# Patient Record
Sex: FEMALE | Employment: UNEMPLOYED | URBAN - METROPOLITAN AREA
[De-identification: names, ages, dates, MRNs, and addresses within clinical notes are randomized per-mention and may not be internally consistent; named-entity substitution may affect disease eponyms.]

---

## 2019-12-31 ENCOUNTER — TRANSFERRED RECORDS (OUTPATIENT)
Dept: HEALTH INFORMATION MANAGEMENT | Facility: CLINIC | Age: 59
End: 2019-12-31

## 2021-04-28 ENCOUNTER — COMMUNICATION - HEALTHEAST (OUTPATIENT)
Dept: CARDIOLOGY | Facility: CLINIC | Age: 61
End: 2021-04-28

## 2021-06-05 ENCOUNTER — TRANSFERRED RECORDS (OUTPATIENT)
Dept: HEALTH INFORMATION MANAGEMENT | Facility: CLINIC | Age: 61
End: 2021-06-05

## 2021-06-21 NOTE — LETTER
Letter by Uriel Mota MD at      Author: Uriel Mota MD Service: -- Author Type: --    Filed:  Encounter Date: 4/28/2021 Status: (Other)         Alejandra Mendoza  [address]  [place] [address]      April 28, 2021      Dear Alejandra,    This letter is to remind you that you are due for your follow up appointment with Dr. Uriel Mota  . To help ensure you are in the best health possible, a regular follow-up with your cardiologist is essential.     Please call our Patient Scheduling Line at [phone] to schedule your appointment at your earliest convenience.  If you have recently scheduled an appointment, please disregard this letter.    We look forward to seeing you again. As always, we are available at the number  above for any questions or concerns you may have.      Sincerely,     The Physicians and Staff of [facility] [place]

## 2021-06-26 ENCOUNTER — COMMUNICATION - HEALTHEAST (OUTPATIENT)
Dept: SCHEDULING | Facility: CLINIC | Age: 61
End: 2021-06-26

## 2021-07-06 NOTE — TELEPHONE ENCOUNTER
RECORDS RECEIVED FROM: Fast Heart Beat, Chest Pain, Chest Tightness   DATE RECEIVED:   NOTES STATUS DETAILS   OFFICE NOTE from referring provider    N/A    OFFICE NOTE from other cardiologist    Care Everywhere [facility]:  12/31/19 - PCC OV with Dr. Langford   DISCHARGE SUMMARY from hospital    N/A    DISCHARGE REPORT from the ER   Care Everywhere [facility]:  6/5/21 - UC OV with Isabel Sanon NP  11/16/20 - UC OV with Dr. Chadwick    [facility]:  11/16/20 - ED OV with Dr. Kent    Critical access hospital - [place]:  12/30/19 - UC OV with Flaquita Mendoza NP   OPERATIVE REPORT    N/A    MEDICATION LIST   Care Everywhere    LABS     BMP   [facility]:  11[facility]20    [facility]  12[facility]   CBC   Care [facility]where [facility]:  11/16/20   CMP   N/A    Lipids   Care [facility]where [facility]:  12/31/19   TSH   N/A    DIAGNOSTIC PROCEDURES     EKG   In process / Internal [facility]:  6/5/21 - ECG 12-lead  12/31/19 - ECG 12-lead    [facility]:  11/17/20 - ECG 12-Lead   Monitor Reports   N/A    IMAGING (DISC & REPORT)      Echo   N/A    Stress Tests   N/A    Cath   N/A    MRI/MRA   N/A    Xray Internal ealth:  11/16/20 - XR Chest   CT/CTA   N/A      Records Requested  07/06/21    [facility]  Fax: [phone]   Outcome * 7/6/21 1:03 PM Faxed req to  for EKG to be sent to the clinic. - Jami    7-6: sent EKGs to scanning

## 2021-08-09 ENCOUNTER — PRE VISIT (OUTPATIENT)
Dept: CARDIOLOGY | Facility: CLINIC | Age: 61
End: 2021-08-09

## 2021-08-30 ENCOUNTER — LAB (OUTPATIENT)
Dept: LAB | Facility: CLINIC | Age: 61
End: 2021-08-30
Payer: COMMERCIAL

## 2021-08-30 ENCOUNTER — OFFICE VISIT (OUTPATIENT)
Dept: CARDIOLOGY | Facility: CLINIC | Age: 61
End: 2021-08-30
Attending: INTERNAL MEDICINE
Payer: COMMERCIAL

## 2021-08-30 VITALS
DIASTOLIC BLOOD PRESSURE: 78 MMHG | HEART RATE: 60 BPM | SYSTOLIC BLOOD PRESSURE: 115 MMHG | WEIGHT: 176.1 LBS | OXYGEN SATURATION: 97 % | HEIGHT: 62 IN | BODY MASS INDEX: 32.41 KG/M2

## 2021-08-30 DIAGNOSIS — R00.0 TACHYCARDIA: Primary | ICD-10-CM

## 2021-08-30 DIAGNOSIS — R00.0 TACHYCARDIA: ICD-10-CM

## 2021-08-30 DIAGNOSIS — I20.0 UNSTABLE ANGINA (H): ICD-10-CM

## 2021-08-30 LAB
CHOLEST SERPL-MCNC: 254 MG/DL
CREAT UR-MCNC: 18 MG/DL
ERYTHROCYTE [DISTWIDTH] IN BLOOD BY AUTOMATED COUNT: 18.1 % (ref 10–15)
FASTING STATUS PATIENT QL REPORTED: ABNORMAL
HCT VFR BLD AUTO: 40.7 % (ref 35–47)
HDLC SERPL-MCNC: 30 MG/DL
HGB BLD-MCNC: 12.3 G/DL (ref 11.7–15.7)
LDLC SERPL CALC-MCNC: 163 MG/DL
LDLC SERPL CALC-MCNC: 178 MG/DL
MCH RBC QN AUTO: 20 PG (ref 26.5–33)
MCHC RBC AUTO-ENTMCNC: 30.2 G/DL (ref 31.5–36.5)
MCV RBC AUTO: 66 FL (ref 78–100)
MICROALBUMIN UR-MCNC: <5 MG/L
MICROALBUMIN/CREAT UR: NORMAL MG/G{CREAT}
NONHDLC SERPL-MCNC: 224 MG/DL
PLATELET # BLD AUTO: 315 10E3/UL (ref 150–450)
RBC # BLD AUTO: 6.14 10E6/UL (ref 3.8–5.2)
TRIGL SERPL-MCNC: 228 MG/DL
TSH SERPL DL<=0.005 MIU/L-ACNC: 0.55 MU/L (ref 0.4–4)
WBC # BLD AUTO: 5.4 10E3/UL (ref 4–11)

## 2021-08-30 PROCEDURE — 99204 OFFICE O/P NEW MOD 45 MIN: CPT | Performed by: INTERNAL MEDICINE

## 2021-08-30 PROCEDURE — 80061 LIPID PANEL: CPT | Performed by: PATHOLOGY

## 2021-08-30 PROCEDURE — 84443 ASSAY THYROID STIM HORMONE: CPT | Performed by: PATHOLOGY

## 2021-08-30 PROCEDURE — 83721 ASSAY OF BLOOD LIPOPROTEIN: CPT | Mod: 90 | Performed by: PATHOLOGY

## 2021-08-30 PROCEDURE — 36415 COLL VENOUS BLD VENIPUNCTURE: CPT | Performed by: PATHOLOGY

## 2021-08-30 PROCEDURE — 83695 ASSAY OF LIPOPROTEIN(A): CPT | Mod: 90 | Performed by: PATHOLOGY

## 2021-08-30 PROCEDURE — 93005 ELECTROCARDIOGRAM TRACING: CPT

## 2021-08-30 PROCEDURE — 85027 COMPLETE CBC AUTOMATED: CPT | Performed by: PATHOLOGY

## 2021-08-30 PROCEDURE — G0463 HOSPITAL OUTPT CLINIC VISIT: HCPCS | Mod: 25

## 2021-08-30 PROCEDURE — 82043 UR ALBUMIN QUANTITATIVE: CPT | Performed by: PATHOLOGY

## 2021-08-30 RX ORDER — ACETAMINOPHEN 500 MG
500 TABLET ORAL
COMMUNITY

## 2021-08-30 ASSESSMENT — MIFFLIN-ST. JEOR: SCORE: 1324.03

## 2021-08-30 NOTE — NURSING NOTE
Labs: Patient was given results of the laboratory testing obtained today and patient was instructed about when to return for the next laboratory testing. Labs today (cmp, cbc, lipid, lipoprotein a, urine for albuminuria)    Med Reconcile: Reviewed and verified all current medications with the patient. The updated medication list was printed and given to the patient.     Return Appointment: Patient given instructions regarding scheduling next clinic visit. *echo with stress    Patient stated she understood all health information given and agreed to call with further questions or concerns.     Mari Gil RN

## 2021-08-30 NOTE — LETTER
Date:October 18, 2021      Patient was self referred, no letter generated. Do not send.        [facility] Health Information

## 2021-08-30 NOTE — PROGRESS NOTES
"Chief Complaint   Patient presents with     New Patient     New Pt Visit, c/o chest pain, fatigue and \"fast heart rate\"      Farrukh Pham, EMT  Clinic Support  [employer]    [phone]    Employed by [facility] [employer]    HPI:     I had the privilege to evaluate Ms Roberts \"Dana\" Faheem Kumar, who is a 60 yr old female patient with a Hx of chest pain and palpitations.  Patient has a history of Hypertension and hypercholesterolemia.   Patient has thalassemia minor.  Patient had similar symptoms last year.    PAST MEDICAL HISTORY:  Hypertension  Hypercholesterolemia    CURRENT MEDICATIONS:  Current Outpatient Medications   Medication Sig Dispense Refill     diphenhydrAMINE-acetaminophen (TYLENOL PM)  MG tablet Take 1 tablet by mouth nightly as needed for sleep       melatonin 5 MG tablet Take 5 mg by mouth nightly as needed for sleep       acetaminophen (TYLENOL) 500 MG tablet Take 500 mg by mouth         PAST SURGICAL HISTORY:  Tonsillectomy      ALLERGIES   No Known Allergies    FAMILY HISTORY:    No Major Problems Daughter       Arthritis Father       Hypertension Father       Other Father   brain aneurysm   Cancer-Colon Maternal Grandmother       Cancer-Breast Mother       Cancer-Stomach Paternal Grandmother       Cancer Sister   Brain Tumor   No Major Problems Son         Relation Name Status Comments   Daughter   Alive     Father    (Age 52)     Maternal Grandmother    (Age 70's)     Mother   Alive     Paternal Grandmother        Sister    (Age 29)     Son   Alive         SOCIAL HISTORY:  Social History     Socioeconomic History     Marital status:      Spouse name: None     Number of children: None     Years of education: None     Highest education level: None   Occupational History     None   Tobacco Use     Former smoker      Smokeless tobacco: Never Used   Substance and Sexual Activity     Alcohol use: None     " "Drug use: None     Sexual activity: None   Other Topics Concern     None   Social History Narrative     None     Social Determinants of Health     Financial Resource Strain:      Difficulty of Paying Living Expenses:    Food Insecurity:      Worried About Running Out of Food in the Last Year:      Ran Out of Food in the Last Year:    Transportation Needs:      Lack of Transportation (Medical):      Lack of Transportation (Non-Medical):    Physical Activity:      Days of Exercise per Week:      Minutes of Exercise per Session:    Stress:      Feeling of Stress :    Social Connections:      Frequency of Communication with Friends and Family:      Frequency of Social Gatherings with Friends and Family:      Attends Hinduism Services:      Active Member of Clubs or Organizations:      Attends Club or Organization Meetings:      Marital Status:    Intimate Partner Violence:      Fear of Current or Ex-Partner:      Emotionally Abused:      Physically Abused:      Sexually Abused:        ROS:   Constitutional: No fever, chills, or sweats. No weight gain/loss   ENT: No visual disturbance, ear ache, epistaxis, sore throat  Allergies/Immunologic: Negative.   Respiratory: No cough, hemoptysia  Cardiovascular: As per HPI  GI: No nausea, vomiting, hematemesis, melena, or hematochezia  : No urinary frequency, dysuria, or hematuria  Integument: Negative  Psychiatric: Negative  Neuro: Negative  Endocrinology: Negative   Musculoskeletal: Negative    EXAM:  /78 (BP Location: Right arm, Patient Position: Sitting, Cuff Size: Adult Regular)   Pulse 60   Ht 1.57 m (5' 1.81\")   Wt 79.9 kg (176 lb 1.6 oz)   SpO2 97%   BMI 32.41 kg/m    In general, the patient is a pleasant female in no apparent distress.    HEENT: NC/AT.  PERRLA.  EOMI.  Sclerae white, not injected.  Nares clear.  Pharynx without erythema or exudate.  Dentition intact.    Neck: No adenopathy.  No thyromegaly. Carotids +4/4 bilaterally without bruits.  No " jugular venous distension.   Heart: RRR. Normal S1, S2 splits physiologically. No murmur, rub, click, or gallop. The PMI is in the 5th ICS in the midclavicular line. There is no heave.    Lungs: CTA.  No ronchi, wheezes, rales.  No dullness to percussion.   Abdomen: Soft, nontender, nondistended. No organomegaly.  No bruits.   Extremities: No clubbing, cyanosis, or edema.  The pulses are +4/4 at the radial, brachial, femoral, popliteal, DP, and PT sites bilaterally.  No bruits are noted.  Neurologic: Alert and oriented to person/place/time, normal speech, gait and affect  Skin: No petechiae, purpura or rash.    Labs:  LIPID RESULTS:  Lab Results   Component Value Date    CHOL 254 (H) 08/30/2021    HDL 30 (L) 08/30/2021     (H) 08/30/2021     (H) 08/30/2021    TRIG 228 (H) 08/30/2021    NHDL 224 (H) 08/30/2021         CBC RESULTS:  Lab Results   Component Value Date    WBC 5.4 08/30/2021    RBC 6.14 (H) 08/30/2021    HGB 12.3 08/30/2021    HCT 40.7 08/30/2021    MCV 66 (L) 08/30/2021    MCH 20.0 (L) 08/30/2021    MCHC 30.2 (L) 08/30/2021    RDW 18.1 (H) 08/30/2021     08/30/2021       BMP RESULTS:  Lab Results   Component Value Date     11/16/2020    POTASSIUM 3.8 11/16/2020    CHLORIDE 108 (H) 11/16/2020    CO2 26 11/16/2020    ANIONGAP 6 11/16/2020    GLC 94 11/16/2020    BUN 9 11/16/2020    CR 1.00 11/16/2020    GFRESTIMATED 57 (L) 11/16/2020    GFRESTBLACK >60 11/16/2020    BETY 8.8 11/16/2020          INR RESULTS:  Lab Results   Component Value Date    INR 0.99 11/16/2020       Procedures:    EKG: sin bradycardia    Echocardiogram    Normal, low-risk dobutamine echocardiogram without evidence of ischemia.  The target heart rate was achieved.  Normal biventricular size, thickness, and global systolic function at  baseline, LVEF=60-65%.  With low-dose dobutamine, LVEF augmented and LV cavity size decreased  appropriately.  With peak dobutamine, LVEF increased further to >70% and LV cavity  size  decreased appropriately.  No regional wall motion abnormalities at rest or with dobutamine.  No angina was elicited.  No ECG evidence of ischemia. Normal heart rate and blood pressure response to  dobutamine.  No significant valvular abnormalities are noted on screening Doppler exam.  The aortic root and visualized ascending aorta are normal.     ______________________________________________________________________________  Stress  The drug infusion was stopped due to target heart rate achieved.  The patient did not exhibit any symptoms during drug infusion.  Patient experienced 1/10 right sided chest pain once drug infusion stopped,  which resolved 3 minutes into recovery.  The maximum dose of dobutamine was 30mcg/kg/min.  The maximum dose of metoprolol was 1mg.  Definity (NDC #38163-060-66) given intravenously.  Patient was given 7ml mixture of 1.5ml Definity and 8.5ml saline.  3 ml wasted.  IV start location R Forearm .  Definity Expiration 11/01/2022 .  Definity Lot # [id number] .  This was a normal stress EKG with no evidence of stress-induced ischemia.  This was a normal stress echocardiogram with no evidence of stress-induced  ischemia.     Baseline  Resting ECG is normal.  Normal left ventricular function and wall motion at rest.     Stress Results                                       Maximum Predicted HR:   161 bpm             Target HR: 137 bpm        % Maximum Predicted HR: 88 %                           Stage DurationHeart Rate  BP   Dose                               (mm:ss)   (bpm)                      Baseline  0:00      59    131/86 0.00                        Peak    8:17      142   158/9630.00                         Stress Duration:   8:17 mm:ss *                      Maximum Stress HR: 142 bpm *     Left Ventricle  The left ventricle is normal in size. There is normal left ventricular wall  thickness. The visual ejection fraction is 55-60%.     Right Ventricle  Normal right ventricle structure  and size.     Atria  Normal left atrial size.     Mitral Valve  The mitral valve leaflets appear normal. There is no evidence of stenosis,  fluttering, or prolapse. There is no mitral regurgitation noted.     Tricuspid Valve  Normal tricuspid valve. There is trace tricuspid regurgitation.     Aortic Valve  Normal tricuspid aortic valve. No aortic regurgitation is present.     Pulmonic Valve  The pulmonic valve is not well seen, but is grossly normal. There is trace  pulmonic valvular regurgitation.     Vessels  Normal size ascending aorta.     Pericardium  The pericardium appears normal.     Procedure  Dobutamine stress echo with two dimensional color and spectral Doppler  performed.    Assessment and Plan:     I discussed the results with patient.  I disucssed the importance of a heart healthy diet and lifestyle.  Stress echocardiography did not show reversible myocardial ischemia.  Because of her high LDL-chol and hypertension, hypercholesterolemia and chest pain - I propose to order a CAC score which will guide us regarding starting lipid-lowering therapy in function of CAC score.    Follow-up within 1 yr with labs comprehensive metabolic panel and lipid profile with direct LDL-chol.    Niall Hernandez MD, PhD  Professor of Medicine  Division of Cardiology                  CC  Patient Care Team:  No Ref-Primary, Physician as PCP - General  Niall Hernandez MD as MD (Cardiovascular Disease)  SELF, REFERRED

## 2021-08-30 NOTE — LETTER
"2021      RE: Alejandra Kumar  [address]  [place] [address]       Dear Colleague,    Thank you for the opportunity to participate in the care of your patient, Alejandra Kumar, at the [facility] [place] [facility]. Please see a copy of my visit note below.    Chief Complaint   Patient presents with     New Patient     New Pt Visit, c/o chest pain, fatigue and \"fast heart rate\"      Farrukh Pham, EMT  Clinic Support  [facility]    [phone]    Employed by [facility] [employer]    HPI:     I had the privilege to evaluate Ms Roberts \"Dana\" Faheem Kumar, who is a 60 yr old female patient with a Hx of chest pain and palpitations.  Patient has a history of Hypertension and hypercholesterolemia.   Patient has thalassemia minor.  Patient had similar symptoms last year.    PAST MEDICAL HISTORY:  Hypertension  Hypercholesterolemia    CURRENT MEDICATIONS:  Current Outpatient Medications   Medication Sig Dispense Refill     diphenhydrAMINE-acetaminophen (TYLENOL PM)  MG tablet Take 1 tablet by mouth nightly as needed for sleep       melatonin 5 MG tablet Take 5 mg by mouth nightly as needed for sleep       acetaminophen (TYLENOL) 500 MG tablet Take 500 mg by mouth         PAST SURGICAL HISTORY:  Tonsillectomy      ALLERGIES   No Known Allergies    FAMILY HISTORY:    No Major Problems Daughter       Arthritis Father       Hypertension Father       Other Father   brain aneurysm   Cancer-Colon Maternal Grandmother       Cancer-Breast Mother       Cancer-Stomach Paternal Grandmother       Cancer Sister   Brain Tumor   No Major Problems Son         Relation Name Status Comments   Daughter   Alive     Father    (Age 52)     Maternal Grandmother    (Age 70's)     Mother   Alive     Paternal Grandmother        Sister    (Age 29)     Son   " Alive         SOCIAL HISTORY:  Social History     Socioeconomic History     Marital status:      Spouse name: None     Number of children: None     Years of education: None     Highest education level: None   Occupational History     None   Tobacco Use     Former smoker      Smokeless tobacco: Never Used   Substance and Sexual Activity     Alcohol use: None     Drug use: None     Sexual activity: None   Other Topics Concern     None   Social History Narrative     None     Social Determinants of Health     Financial Resource Strain:      Difficulty of Paying Living Expenses:    Food Insecurity:      Worried About Running Out of Food in the Last Year:      Ran Out of Food in the Last Year:    Transportation Needs:      Lack of Transportation (Medical):      Lack of Transportation (Non-Medical):    Physical Activity:      Days of Exercise per Week:      Minutes of Exercise per Session:    Stress:      Feeling of Stress :    Social Connections:      Frequency of Communication with Friends and Family:      Frequency of Social Gatherings with Friends and Family:      Attends Faith Services:      Active Member of Clubs or Organizations:      Attends Club or Organization Meetings:      Marital Status:    Intimate Partner Violence:      Fear of Current or Ex-Partner:      Emotionally Abused:      Physically Abused:      Sexually Abused:        ROS:   Constitutional: No fever, chills, or sweats. No weight gain/loss   ENT: No visual disturbance, ear ache, epistaxis, sore throat  Allergies/Immunologic: Negative.   Respiratory: No cough, hemoptysia  Cardiovascular: As per HPI  GI: No nausea, vomiting, hematemesis, melena, or hematochezia  : No urinary frequency, dysuria, or hematuria  Integument: Negative  Psychiatric: Negative  Neuro: Negative  Endocrinology: Negative   Musculoskeletal: Negative    EXAM:  /78 (BP Location: Right arm, Patient Position: Sitting, Cuff Size: Adult Regular)   Pulse 60   Ht 1.57  "m (5' 1.81\")   Wt 79.9 kg (176 lb 1.6 oz)   SpO2 97%   BMI 32.41 kg/m    In general, the patient is a pleasant female in no apparent distress.    HEENT: NC/AT.  PERRLA.  EOMI.  Sclerae white, not injected.  Nares clear.  Pharynx without erythema or exudate.  Dentition intact.    Neck: No adenopathy.  No thyromegaly. Carotids +4/4 bilaterally without bruits.  No jugular venous distension.   Heart: RRR. Normal S1, S2 splits physiologically. No murmur, rub, click, or gallop. The PMI is in the 5th ICS in the midclavicular line. There is no heave.    Lungs: CTA.  No ronchi, wheezes, rales.  No dullness to percussion.   Abdomen: Soft, nontender, nondistended. No organomegaly.  No bruits.   Extremities: No clubbing, cyanosis, or edema.  The pulses are +4/4 at the radial, brachial, femoral, popliteal, DP, and PT sites bilaterally.  No bruits are noted.  Neurologic: Alert and oriented to person/place/time, normal speech, gait and affect  Skin: No petechiae, purpura or rash.    Labs:  LIPID RESULTS:  Lab Results   Component Value Date    CHOL 254 (H) 08/30/2021    HDL 30 (L) 08/30/2021     (H) 08/30/2021     (H) 08/30/2021    TRIG 228 (H) 08/30/2021    NHDL 224 (H) 08/30/2021         CBC RESULTS:  Lab Results   Component Value Date    WBC 5.4 08/30/2021    RBC 6.14 (H) 08/30/2021    HGB 12.3 08/30/2021    HCT 40.7 08/30/2021    MCV 66 (L) 08/30/2021    MCH 20.0 (L) 08/30/2021    MCHC 30.2 (L) 08/30/2021    RDW 18.1 (H) 08/30/2021     08/30/2021       BMP RESULTS:  Lab Results   Component Value Date     11/16/2020    POTASSIUM 3.8 11/16/2020    CHLORIDE 108 (H) 11/16/2020    CO2 26 11/16/2020    ANIONGAP 6 11/16/2020    GLC 94 11/16/2020    BUN 9 11/16/2020    CR 1.00 11/16/2020    GFRESTIMATED 57 (L) 11/16/2020    GFRESTBLACK >60 11/16/2020    BETY 8.8 11/16/2020          INR RESULTS:  Lab Results   Component Value Date    INR 0.99 11/16/2020       Procedures:    EKG: sin " bradycardia    Echocardiogram    Normal, low-risk dobutamine echocardiogram without evidence of ischemia.  The target heart rate was achieved.  Normal biventricular size, thickness, and global systolic function at  baseline, LVEF=60-65%.  With low-dose dobutamine, LVEF augmented and LV cavity size decreased  appropriately.  With peak dobutamine, LVEF increased further to >70% and LV cavity size  decreased appropriately.  No regional wall motion abnormalities at rest or with dobutamine.  No angina was elicited.  No ECG evidence of ischemia. Normal heart rate and blood pressure response to  dobutamine.  No significant valvular abnormalities are noted on screening Doppler exam.  The aortic root and visualized ascending aorta are normal.     ______________________________________________________________________________  Stress  The drug infusion was stopped due to target heart rate achieved.  The patient did not exhibit any symptoms during drug infusion.  Patient experienced 1/10 right sided chest pain once drug infusion stopped,  which resolved 3 minutes into recovery.  The maximum dose of dobutamine was 30mcg/kg/min.  The maximum dose of metoprolol was 1mg.  Definity (NDC #29436-985-03) given intravenously.  Patient was given 7ml mixture of 1.5ml Definity and 8.5ml saline.  3 ml wasted.  IV start location R Forearm .  Definity Expiration 11/01/2022 .  Definity Lot # [id number] .  This was a normal stress EKG with no evidence of stress-induced ischemia.  This was a normal stress echocardiogram with no evidence of stress-induced  ischemia.     Baseline  Resting ECG is normal.  Normal left ventricular function and wall motion at rest.     Stress Results                                       Maximum Predicted HR:   161 bpm             Target HR: 137 bpm        % Maximum Predicted HR: 88 %                           Stage DurationHeart Rate  BP   Dose                               (mm:ss)   (bpm)                      Baseline   0:00      59    131/86 0.00                        Peak    8:17      142   158/9630.00                         Stress Duration:   8:17 mm:ss *                      Maximum Stress HR: 142 bpm *     Left Ventricle  The left ventricle is normal in size. There is normal left ventricular wall  thickness. The visual ejection fraction is 55-60%.     Right Ventricle  Normal right ventricle structure and size.     Atria  Normal left atrial size.     Mitral Valve  The mitral valve leaflets appear normal. There is no evidence of stenosis,  fluttering, or prolapse. There is no mitral regurgitation noted.     Tricuspid Valve  Normal tricuspid valve. There is trace tricuspid regurgitation.     Aortic Valve  Normal tricuspid aortic valve. No aortic regurgitation is present.     Pulmonic Valve  The pulmonic valve is not well seen, but is grossly normal. There is trace  pulmonic valvular regurgitation.     Vessels  Normal size ascending aorta.     Pericardium  The pericardium appears normal.     Procedure  Dobutamine stress echo with two dimensional color and spectral Doppler  performed.    Assessment and Plan:     I discussed the results with patient.  I disucssed the importance of a heart healthy diet and lifestyle.  Stress echocardiography did not show reversible myocardial ischemia.  Because of her high LDL-chol and hypertension, hypercholesterolemia and chest pain - I propose to order a CAC score which will guide us regarding starting lipid-lowering therapy in function of CAC score.    Follow-up within 1 yr with labs comprehensive metabolic panel and lipid profile with direct LDL-chol.    Niall Hernandez MD, PhD  Professor of Medicine  Division of Cardiology                  CC  Patient Care Team:  No Ref-Primary, Physician as PCP - General  Niall Hernandez MD as MD (Cardiovascular Disease)  SELF, REFERRED      Please do not hesitate to contact me if you have any questions/concerns.     Sincerely,     Niall Hernandez MD

## 2021-08-30 NOTE — PATIENT INSTRUCTIONS
You were seen today in the Cardiovascular Clinic at the [facility].     Cardiology Providers you saw during your visit:  Dr Hernandez      Recommendations:    *Labs today  *Stress echo in the next week    Follow-up:  Pending results of testing      For scheduling needs [phone] option 1 and the option 1 again.  Nursing questions: [phone] option 1 then chose option 4 for the triage nurse.  For emergencies call 911.    Thank you for your visit today!     Please call if you have any questions or concerns.    [facility] - [facility] Care

## 2021-08-30 NOTE — NURSING NOTE
"Chief Complaint   Patient presents with     New Patient     New Pt Visit, c/o chest pain, fatigue and \"fast heart rate\"      EKG was performed.    Alban Hancock, EMT  1:04 PM  "

## 2021-08-31 ENCOUNTER — TELEPHONE (OUTPATIENT)
Dept: CARDIOLOGY | Facility: CLINIC | Age: 61
End: 2021-08-31

## 2021-08-31 LAB
ATRIAL RATE - MUSE: 55 BPM
DIASTOLIC BLOOD PRESSURE - MUSE: NORMAL MMHG
INTERPRETATION ECG - MUSE: NORMAL
P AXIS - MUSE: 38 DEGREES
PR INTERVAL - MUSE: 184 MS
QRS DURATION - MUSE: 84 MS
QT - MUSE: 440 MS
QTC - MUSE: 420 MS
R AXIS - MUSE: -18 DEGREES
SYSTOLIC BLOOD PRESSURE - MUSE: NORMAL MMHG
T AXIS - MUSE: 15 DEGREES
VENTRICULAR RATE- MUSE: 55 BPM

## 2021-08-31 NOTE — TELEPHONE ENCOUNTER
[facility] Call Center    Phone Message    May a detailed message be left on voicemail: yes     Reason for Call: Order(s): Other: ECHO DOBUTAMINE STRESS TEST [[id number]]  Reason for requested: Pt requesting that these orders be faxed to [facility] at [phone]. Please fax and call patient once orders have been sent over. Thank you!  Date needed: 08/31/21  Provider name: Dr. Hernandez      Action Taken: Message routed to:  [facility] Cardio    Travel Screening: Not Applicable

## 2021-08-31 NOTE — TELEPHONE ENCOUNTER
Orders faxed to [facility] [phone]. Pt notified via telephone.     Breezy Pennington, RN   Cardiology Nurse Coordinator

## 2021-09-02 LAB — LPA SERPL-MCNC: 23 MG/DL

## 2021-09-03 ENCOUNTER — TELEPHONE (OUTPATIENT)
Dept: CARDIOLOGY | Facility: CLINIC | Age: 61
End: 2021-09-03

## 2021-09-03 ENCOUNTER — HOSPITAL ENCOUNTER (OUTPATIENT)
Dept: CARDIOLOGY | Facility: CLINIC | Age: 61
Discharge: HOME | End: 2021-09-03
Attending: INTERNAL MEDICINE | Admitting: INTERNAL MEDICINE
Payer: COMMERCIAL

## 2021-09-03 DIAGNOSIS — R00.0 TACHYCARDIA: ICD-10-CM

## 2021-09-03 DIAGNOSIS — I20.0 UNSTABLE ANGINA (H): ICD-10-CM

## 2021-09-03 PROCEDURE — 250N000009 HC RX 250: Performed by: INTERNAL MEDICINE

## 2021-09-03 PROCEDURE — 93016 CV STRESS TEST SUPVJ ONLY: CPT | Mod: GC | Performed by: INTERNAL MEDICINE

## 2021-09-03 PROCEDURE — 93325 DOPPLER ECHO COLOR FLOW MAPG: CPT | Mod: TC

## 2021-09-03 PROCEDURE — 250N000011 HC RX IP 250 OP 636: Performed by: INTERNAL MEDICINE

## 2021-09-03 PROCEDURE — 93018 CV STRESS TEST I&R ONLY: CPT | Mod: GC | Performed by: INTERNAL MEDICINE

## 2021-09-03 PROCEDURE — 93325 DOPPLER ECHO COLOR FLOW MAPG: CPT | Mod: 26 | Performed by: INTERNAL MEDICINE

## 2021-09-03 PROCEDURE — C8928 TTE W OR W/O FOL W/CON,STRES: HCPCS

## 2021-09-03 PROCEDURE — 93350 STRESS TTE ONLY: CPT | Mod: 26 | Performed by: INTERNAL MEDICINE

## 2021-09-03 PROCEDURE — 255N000002 HC RX 255 OP 636: Performed by: INTERNAL MEDICINE

## 2021-09-03 PROCEDURE — 93321 DOPPLER ECHO F-UP/LMTD STD: CPT | Mod: 26 | Performed by: INTERNAL MEDICINE

## 2021-09-03 RX ORDER — ATROPINE SULFATE 0.4 MG/ML
.2-2 AMPUL (ML) INJECTION
Status: DISCONTINUED | OUTPATIENT
Start: 2021-09-03 | End: 2021-09-04 | Stop reason: HOSPADM

## 2021-09-03 RX ORDER — DOBUTAMINE HYDROCHLORIDE 200 MG/100ML
10-50 INJECTION INTRAVENOUS CONTINUOUS
Status: ACTIVE | OUTPATIENT
Start: 2021-09-03 | End: 2021-09-03

## 2021-09-03 RX ORDER — METOPROLOL TARTRATE 1 MG/ML
1-20 INJECTION, SOLUTION INTRAVENOUS
Status: ACTIVE | OUTPATIENT
Start: 2021-09-03 | End: 2021-09-03

## 2021-09-03 RX ORDER — SODIUM CHLORIDE 9 MG/ML
INJECTION, SOLUTION INTRAVENOUS CONTINUOUS
Status: ACTIVE | OUTPATIENT
Start: 2021-09-03 | End: 2021-09-03

## 2021-09-03 RX ADMIN — METOPROLOL TARTRATE 1 MG: 1 INJECTION, SOLUTION INTRAVENOUS at 11:14

## 2021-09-03 RX ADMIN — DOBUTAMINE IN DEXTROSE 10 MCG/KG/MIN: 200 INJECTION, SOLUTION INTRAVENOUS at 11:03

## 2021-09-03 RX ADMIN — PERFLUTREN 7 ML: 6.52 INJECTION, SUSPENSION INTRAVENOUS at 11:16

## 2021-09-03 NOTE — PROGRESS NOTES
Pt here for dobutamine stress test. Test, meds and side effects reviewed with patient. Achieved target HR at 30 mcg Dobutamine and given a total of 1 mg IV Metoprolol to bring HR back to baseline.  Post monitoring complete and VSS. Pt escorted out to the gold waiting room.

## 2021-09-07 NOTE — TELEPHONE ENCOUNTER
Left VM : please call back if your symptoms have not resolved which they should have by now after dobutamine wore off.     Gave callback number

## 2021-09-13 ENCOUNTER — TELEPHONE (OUTPATIENT)
Dept: CARDIOLOGY | Facility: CLINIC | Age: 61
End: 2021-09-13

## 2021-09-13 NOTE — TELEPHONE ENCOUNTER
Spoke to patient.   She is not on mychart and will not register.   Prefers in person appt.   Gave appt next Thursday to discuss follow up.  Patient agrees with plan.

## 2021-09-13 NOTE — TELEPHONE ENCOUNTER
[facility]    Phone Message    May a detailed message be left on voicemail: no     Reason for Call: Other: Dana called regarding the lab results, she would like to know if she is able to change her diet and nutrition rather than use medication. Also, she would like to know what the care plan is going forward, as her symptoms (Difficulty in walking and exercise as well as shortness of breath) still exist. Please reach out to Dana with clarification.      Action Taken: Message routed to:  [facility] ([facility]): Cardiology    Travel Screening: Not Applicable

## 2021-10-29 ENCOUNTER — TELEPHONE (OUTPATIENT)
Dept: OPHTHALMOLOGY | Facility: CLINIC | Age: 61
End: 2021-10-29

## 2021-10-29 NOTE — TELEPHONE ENCOUNTER
Health Call Center    Phone Message    May a detailed message be left on voicemail: yes     Reason for Call: Other:   Pt scheduled with Mary for 2nd opinion for cataract on 11/29. Pt later revealed that she just had cataract surgery 2 wks ago and is having trouble with blurry vision. Pt is being told that she needs another surgery and is seeking for 2nd opinion. Writer wants to make sure that clinic/Mary will see pts who has had a surgery this recent. Please follow-up with pt if this appointment is schedule inappropriately.     Action Taken: Other:  eye    Travel Screening: Not Applicable                                                                          Patent

## 2021-10-29 NOTE — TELEPHONE ENCOUNTER
I forwarded this message to Dr. Hernandez's team to review, but this appt should be with acute clinic.     Cesilia Nuñez Communication Facilitator on 10/29/2021 at 10:33 AM

## 2021-11-10 ENCOUNTER — OFFICE VISIT (OUTPATIENT)
Dept: OPHTHALMOLOGY | Facility: CLINIC | Age: 61
End: 2021-11-10
Attending: OPHTHALMOLOGY
Payer: COMMERCIAL

## 2021-11-10 DIAGNOSIS — Z96.1 PSEUDOPHAKIA OF BOTH EYES: Primary | ICD-10-CM

## 2021-11-10 DIAGNOSIS — H26.493 PCO (POSTERIOR CAPSULAR OPACIFICATION), BILATERAL: ICD-10-CM

## 2021-11-10 DIAGNOSIS — H35.341 MACULAR HOLE, RIGHT EYE: ICD-10-CM

## 2021-11-10 PROCEDURE — 99204 OFFICE O/P NEW MOD 45 MIN: CPT | Mod: 57 | Performed by: OPHTHALMOLOGY

## 2021-11-10 PROCEDURE — 66821 AFTER CATARACT LASER SURGERY: CPT | Mod: RT | Performed by: OPHTHALMOLOGY

## 2021-11-10 PROCEDURE — G0463 HOSPITAL OUTPT CLINIC VISIT: HCPCS | Mod: 25

## 2021-11-10 PROCEDURE — 92015 DETERMINE REFRACTIVE STATE: CPT

## 2021-11-10 ASSESSMENT — CUP TO DISC RATIO
OS_RATIO: 0.3
OD_RATIO: 0.3

## 2021-11-10 ASSESSMENT — VISUAL ACUITY
OS_CC+: -1
OD_CC: 20/20
OS_PH_CC: 20/25
OS_PH_CC+: -1
OS_CC: 20/30
METHOD: SNELLEN - LINEAR

## 2021-11-10 ASSESSMENT — REFRACTION_WEARINGRX
OS_SPHERE: -2.00
OD_AXIS: 057
OS_CYLINDER: SPHERE
OD_CYLINDER: +0.50
OD_SPHERE: -3.00

## 2021-11-10 ASSESSMENT — REFRACTION_MANIFEST
OD_ADD: +2.25
OD_SPHERE: -3.00
OD_AXIS: 061
OS_ADD: +2.25
OD_CYLINDER: +0.50
OS_CYLINDER: SPHERE
OS_SPHERE: -2.50

## 2021-11-10 ASSESSMENT — EXTERNAL EXAM - LEFT EYE: OS_EXAM: NORMAL

## 2021-11-10 ASSESSMENT — TONOMETRY
OS_IOP_MMHG: 14
IOP_METHOD: APPLANATION
OD_IOP_MMHG: 15

## 2021-11-10 ASSESSMENT — SLIT LAMP EXAM - LIDS
COMMENTS: NORMAL
COMMENTS: NORMAL

## 2021-11-10 ASSESSMENT — EXTERNAL EXAM - RIGHT EYE: OD_EXAM: NORMAL

## 2021-11-10 NOTE — NURSING NOTE
Chief Complaints and History of Present Illnesses   Patient presents with     Consult     Chief Complaint(s) and History of Present Illness(es)     Consult               Comments     Dana is here today for a second opinion after cataract surgery At [facility]. She has some clouding LE and has been told she may need laser. She does not have records from [facility] with her today.  She also has a macular hole RE and says she is having trouble having someone perform cataract surgery.   She denies pain or flashes, but has seen some floaters RE    Denys Covington COT 12:14 PM November 10, 2021

## 2021-11-10 NOTE — PROGRESS NOTES
"Chief Complaint(s) and History of Present Illness(es)     Consult               Comments     Dana is here today for a second opinion after cataract surgery At [facility]. She has some clouding LE and has been told she may need   laser. She does not have records from [facility] with her today.  She also has a macular hole RE and says she [place] cataract [place]   [place].  She denies pain or flashes, but has seen some floaters RE    Denys Covington COT 12:14 PM November 10, 2021               Review of systems for the eyes was negative other than the pertinent positives/negatives listed in the HPI.      Assessment & Plan      Alejandra Kumar is a 61 year old female with the following diagnoses:   1. Pseudophakia of both eyes    2. PCO (posterior capsular opacification), bilateral    3. Macular hole, right eye         Here for second opinion of disatisfaction following cataract extraction.  S/P complex cataract surgery right eye in [place] in 2004, ?polar cataract.  No records available for review.  Has been followed locally for macular hole, which was found stable at last visit this month    Left eye cataract surgery 3 weeks ago at [facility], records not available  Notes a \"blind spot in that eye\" and blurred vision  Right eye always looks \"sick\"  Wearing glasses from before surgery   Scheduled for possible laser at [facility] in December    Discussed concerns and findings of posterior capsular opacity (PCO) right eye >> left eye   RBA to right eye YAG capsulotomy reviewed versus update in glasses prescription   Would not recommend YAG left eye at this time  She would like to proceed with the right eye laser today  Will return in January to reassess left eye posterior capsular opacity (PCO) and consider laser as needed   Will continue to monitor retinal hole with her outside provider  Return precautions reviewed       Patient disposition:   Return in about 2 months (around 1/10/2022) for DFE, " Refraction.           Attending Physician Attestation:  Complete documentation of historical and exam elements from today's encounter can be found in the full encounter summary report (not reduplicated in this progress note).  I personally obtained the chief complaint(s) and history of present illness.  I confirmed and edited as necessary the review of systems, past medical/surgical history, family history, social history, and examination findings as documented by others; and I examined the patient myself.  I personally reviewed the relevant tests, images, and reports as documented above.  I formulated and edited as necessary the assessment and plan and discussed the findings and management plan with the patient and family. . - Dimitri Cerda MD

## 2021-11-16 ENCOUNTER — TELEPHONE (OUTPATIENT)
Dept: OPHTHALMOLOGY | Facility: CLINIC | Age: 61
End: 2021-11-16
Payer: COMMERCIAL

## 2021-11-16 NOTE — TELEPHONE ENCOUNTER
Pt spoke to fabien and reviewed blood spot on white part of eye which increased in size over 5 days    No new vision changes and no new eye pain-- some discomfort and similar to when has sinus pressure.    Reviewed blood on white part of eye-- may take 1-2 weeks to absorb, may look worse before better.     Reviewed ok to monitor and pt prefers vs exam today-- cancelled today's exam    Reviewed to call next week if not improving and earlier for any new eye pain, vision changes    reviewed ok for pt's regular activity and may use artificial tears for mild discomfort    Pt seemed comfortable with plan    Note to Dr. Patiño for review and amendment if applicable    Jean Marie Jackson RN 11:43 AM 11/16/21            Spoke to pt at 1100    Right eye redness times 5 days-- pt noticed after having more discomfort in eye--pressure/sinus like pain.    Some blurrier vision also noted    Located in certain quadrant of eye and pt unsure if blood on white part of eye/broken vessel    Recent YAG cap laser    Offered appt today and pt accepts    Jean Marie Jackson RN 11:06 AM 11/16/21             [facility] Call Center    Phone Message    May a detailed message be left on voicemail: yes     Reason for Call: Symptoms or Concerns     If patient has red-flag symptoms, warm transfer to triage line    Current symptom or concern: Redness    Symptoms have been present for:  5 day(s)    Has patient previously been seen for this? Yes    By : Dr. Cerda    Date: 11/10/2021    Are there any new or worsening symptoms? Yes      Action Taken: Message routed to:  [facility] EYE    Travel Screening: Not Applicable

## 2022-01-11 ENCOUNTER — NURSE TRIAGE (OUTPATIENT)
Dept: NURSING | Facility: CLINIC | Age: 62
End: 2022-01-11
Payer: COMMERCIAL

## 2022-01-12 NOTE — TELEPHONE ENCOUNTER
Patient calling with questions about:    1.  Isolation precautions in household where 1 person has tested positive and the other has not.    2.  Wondering about the threat of covid to household pets.     Care advice given per protocol guidelines Patient verbalizes understanding and agrees with plan of care.     Mindy Cardozo RN, Nurse Advisor 9:19 PM 1/11/2022    COVID 19 Nurse Triage Plan/Patient Instructions    Please be aware that novel coronavirus (COVID-19) may be circulating in the community. If you develop symptoms such as fever, cough, or SOB or if you have concerns about the presence of another infection including coronavirus (COVID-19), please contact your health care provider or visit [email or web address].     Disposition/Instructions    Home care recommended. Follow home care protocol based instructions.    Thank you for taking steps to prevent the spread of this virus.  o Limit your contact with others.  o Wear a simple mask to cover your cough.  o Wash your hands well and often.    Resources    [facility]: About COVID-19: [email or web address]    CDC: What to Do If You're Sick: www.cdc.gov/coronavirus/2019-ncov/about/steps-when-sick.html    CDC: Ending Home Isolation: www.cdc.gov/coronavirus/2019-ncov/hcp/disposition-in-home-patients.html     CDC: Caring for Someone: www.cdc.gov/coronavirus/2019-ncov/if-you-are-sick/care-for-someone.html     [facility]: Interim Guidance for Hospital Discharge to Home: www.health.[email or web address]/diseases/coronavirus/hcp/hospdischarge.pdf    [facility] clinical trials (COVID-19 research studies): [email or web address]/[email or web address]-clinical-trials     Below are the COVID-19 hotlines at the [facility] ([facility]). Interpreters are available.   o For health questions: Call 549-368-7801 or 1-673.381.6474 (7 a.m. to 7 p.m.)  o For questions about schools and childcare: Call 749-895-9512 or 1-520.128.3319 (7 a.m. to 7 p.m.)       Reason for  Disposition    COVID-19 Testing, questions about    Additional Information    Negative: COVID-19 lab test positive    Negative: [1] Lives with someone known to have influenza (flu test positive) AND [2] flu-like symptoms (e.g., cough, runny nose, sore throat, SOB; with or without fever)    Negative: [1] Symptoms of COVID-19 (e.g., cough, fever, SOB, or others) AND [2] HCP diagnosed COVID-19 based on symptoms    Negative: [1] Symptoms of COVID-19 (e.g., cough, fever, SOB, or others) AND [2] lives in an area with community spread    Negative: [1] Symptoms of COVID-19 (e.g., cough, fever, SOB, or others) AND [2] within 14 days of EXPOSURE (close contact) with diagnosed or suspected COVID-19 patient    Negative: [1] Symptoms of COVID-19 (e.g., cough, fever, SOB, or others) AND [2] within 14 days of travel from high-risk area for COVID-19 community spread (identified by CDC)    Negative: [1] Difficulty breathing (shortness of breath) occurs AND [2] onset > 14 days after COVID-19 EXPOSURE (Close Contact)    Negative: [1] Dry cough occurs AND [2] onset > 14 days after COVID-19 EXPOSURE    Negative: [1] Wet cough (i.e., white-yellow, yellow, green, or karel colored sputum) AND [2] onset > 14 days after COVID-19 EXPOSURE    Negative: [1] Common cold symptoms AND [2] onset > 14 days after COVID-19 EXPOSURE    Negative: COVID-19 vaccine reaction suspected (e.g., fever, headache, muscle aches) occurring during days 1-3 after getting vaccine    Negative: COVID-19 vaccine, questions about    Negative: [1] CLOSE CONTACT COVID-19 EXPOSURE within last 14 days AND [2] needs COVID-19 lab test to return to work AND [3] NO symptoms    Negative: [1] CLOSE CONTACT COVID-19 EXPOSURE within last 14 days AND [2] exposed person is a  (e.g., police or paramedic) AND [3] NO symptoms    Negative: [1] CLOSE CONTACT COVID-19 EXPOSURE within last 14 days AND [2] exposed person is a healthcare worker who was NOT using all recommended  personal protective equipment (e.g., a respirator-N95 mask, eye protection, gloves, and gown) AND [3] NO symptoms    Negative: [1] Living or working in a correctional facility, long-term care facility, or shelter (i.e., congregate setting; densely populated) AND [2] where an outbreak has occurred AND [3] NO symptoms    Negative: [1] CLOSE CONTACT COVID-19 EXPOSURE within last 14 days AND [2] NO symptoms    Negative: [1] Has NOT completed COVID-19 vaccine series AND [2] was at a large indoor or outdoor event (e.g., concert, festival, rally, wedding) or been in crowded indoor setting AND [3] within last 14 days    Negative: [1] COVID-19 EXPOSURE AND [2] 15 or more days ago AND [3] NO symptoms    Negative: [1] Living in area with community spread (identified by local PHD) BUT [2] NO symptoms    Negative: [1] Travel from area with community spread (identified by CDC) AND [2] within last 14 days BUT [3] NO symptoms    Negative: [1] No COVID-19 EXPOSURE BUT [2] living with someone who was exposed and who has no symptoms of COVID-19    Negative: [1] Caller concerned that exposure to COVID-19 occurred BUT [2] does not meet COVID-19 EXPOSURE criteria from CDC    Protocols used: CORONAVIRUS (COVID-19) EXPOSURE-A- 8.25.2021

## 2022-02-16 ENCOUNTER — OFFICE VISIT (OUTPATIENT)
Dept: OPHTHALMOLOGY | Facility: CLINIC | Age: 62
End: 2022-02-16
Attending: OPHTHALMOLOGY
Payer: COMMERCIAL

## 2022-02-16 DIAGNOSIS — H35.341 MACULAR HOLE, RIGHT EYE: ICD-10-CM

## 2022-02-16 DIAGNOSIS — Z96.1 PSEUDOPHAKIA OF BOTH EYES: Primary | ICD-10-CM

## 2022-02-16 PROCEDURE — 92014 COMPRE OPH EXAM EST PT 1/>: CPT | Performed by: OPHTHALMOLOGY

## 2022-02-16 PROCEDURE — G0463 HOSPITAL OUTPT CLINIC VISIT: HCPCS | Mod: 25

## 2022-02-16 RX ORDER — PROPRANOLOL HYDROCHLORIDE 10 MG/1
TABLET ORAL
COMMUNITY
Start: 2022-01-20 | End: 2023-10-13

## 2022-02-16 ASSESSMENT — SLIT LAMP EXAM - LIDS
COMMENTS: NORMAL
COMMENTS: NORMAL

## 2022-02-16 ASSESSMENT — REFRACTION_MANIFEST
OD_ADD: +2.50
OS_ADD: +2.50
OD_SPHERE: -3.25
OD_CYLINDER: +0.50
OD_AXIS: 058
OS_CYLINDER: SPHERE
OS_SPHERE: -2.50

## 2022-02-16 ASSESSMENT — VISUAL ACUITY
CORRECTION_TYPE: GLASSES
OD_CC: 20/25
OS_CC: 20/25
OS_CC+: -2
METHOD: SNELLEN - LINEAR

## 2022-02-16 ASSESSMENT — CUP TO DISC RATIO
OD_RATIO: 0.3
OS_RATIO: 0.3

## 2022-02-16 ASSESSMENT — REFRACTION_WEARINGRX
OS_CYLINDER: SPHERE
OD_SPHERE: -3.00
OD_AXIS: 057
OS_SPHERE: -2.00
OD_CYLINDER: +0.50

## 2022-02-16 ASSESSMENT — CONF VISUAL FIELD
OD_NORMAL: 1
OS_NORMAL: 1

## 2022-02-16 ASSESSMENT — TONOMETRY
OS_IOP_MMHG: 19
OD_IOP_MMHG: 19
IOP_METHOD: TONOPEN

## 2022-02-16 ASSESSMENT — EXTERNAL EXAM - RIGHT EYE: OD_EXAM: NORMAL

## 2022-02-16 ASSESSMENT — EXTERNAL EXAM - LEFT EYE: OS_EXAM: NORMAL

## 2022-02-16 NOTE — NURSING NOTE
Chief Complaints and History of Present Illnesses   Patient presents with     Follow Up     Chief Complaint(s) and History of Present Illness(es)     Follow Up               Comments     Pt states that her left eye had CE/IOL done by another office and that she is coming to check if her eye needs to be corrected. Pt says that her vision has improved after the surgery but that it may not be as clear as it can be. She says that it is hard for her read and that it is difficult to accommodate from near to far vision. Pt states that she is still experiencing floaters in her right eye but that there have been no changes.    Pt denies other ocular complaints.  Skip Nuñez OT 9:28 AM February 16, 2022

## 2022-02-16 NOTE — PROGRESS NOTES
Chief Complaint(s) and History of Present Illness(es)     Follow Up               Comments     Pt states that her left eye had CE/IOL done by another office and that   she is coming to check if her eye needs to be corrected. Pt says that her   vision has improved after the surgery but that it may not be as clear as   it can be. She says that it is hard for her read and that it is difficult   to accommodate from near to far vision. Pt states that she is still   experiencing floaters in her right eye but that there have been no   changes.    Pt denies other ocular complaints.  Skip Nuñez OT 9:28 AM February 16, 2022            Review of systems for the eyes was negative other than the pertinent positives/negatives listed in the HPI.      Assessment & Plan      Alejandra Kumar is a 61 year old female with the following diagnoses:   1. Pseudophakia of both eyes    2. Macular hole, right eye         Symptoms improved s/p YAG right eye   Minimal posterior capsular opacity (PCO) left eye   No significant difference noted in vision right eye to left eye now  Floaters seem less bothersome in both eyes     Discussed symptoms of retinal tear/detachment and the need to be seen urgently should they occur   Monitor left eye posterior capsular opacity (PCO) at this time    Ok to update glasses as needed  Refractive options reviewed  Refraction given       Patient disposition:   Return in 6 months (on 8/16/2022) for VT only, OCT Macula.           Attending Physician Attestation:  Complete documentation of historical and exam elements from today's encounter can be found in the full encounter summary report (not reduplicated in this progress note).  I personally obtained the chief complaint(s) and history of present illness.  I confirmed and edited as necessary the review of systems, past medical/surgical history, family history, social history, and examination findings as documented by others; and I examined the patient  myself.  I personally reviewed the relevant tests, images, and reports as documented above.  I formulated and edited as necessary the assessment and plan and discussed the findings and management plan with the patient and family. . - Dimitri Cerda MD

## 2022-07-09 ENCOUNTER — HOSPITAL ENCOUNTER (EMERGENCY)
Facility: CLINIC | Age: 62
Discharge: HOME | End: 2022-07-09
Attending: EMERGENCY MEDICINE | Admitting: EMERGENCY MEDICINE
Payer: COMMERCIAL

## 2022-07-09 ENCOUNTER — APPOINTMENT (OUTPATIENT)
Dept: RADIOLOGY | Facility: CLINIC | Age: 62
End: 2022-07-09
Attending: EMERGENCY MEDICINE
Payer: COMMERCIAL

## 2022-07-09 ENCOUNTER — NURSE TRIAGE (OUTPATIENT)
Dept: NURSING | Facility: CLINIC | Age: 62
End: 2022-07-09

## 2022-07-09 VITALS
HEART RATE: 51 BPM | TEMPERATURE: 97.1 F | SYSTOLIC BLOOD PRESSURE: 183 MMHG | HEIGHT: 63 IN | RESPIRATION RATE: 16 BRPM | WEIGHT: 160 LBS | OXYGEN SATURATION: 99 % | BODY MASS INDEX: 28.35 KG/M2 | DIASTOLIC BLOOD PRESSURE: 91 MMHG

## 2022-07-09 VITALS
SYSTOLIC BLOOD PRESSURE: 160 MMHG | DIASTOLIC BLOOD PRESSURE: 92 MMHG | WEIGHT: 160 LBS | BODY MASS INDEX: 28.34 KG/M2 | RESPIRATION RATE: 16 BRPM | TEMPERATURE: 98.2 F | HEART RATE: 64 BPM | OXYGEN SATURATION: 99 %

## 2022-07-09 DIAGNOSIS — R20.2 NUMBNESS AND TINGLING OF RIGHT ARM AND LEG: ICD-10-CM

## 2022-07-09 DIAGNOSIS — R51.9 NONINTRACTABLE HEADACHE, UNSPECIFIED CHRONICITY PATTERN, UNSPECIFIED HEADACHE TYPE: ICD-10-CM

## 2022-07-09 DIAGNOSIS — R07.9 NONSPECIFIC CHEST PAIN: ICD-10-CM

## 2022-07-09 DIAGNOSIS — R20.0 NUMBNESS AND TINGLING OF RIGHT ARM AND LEG: ICD-10-CM

## 2022-07-09 PROBLEM — K57.92 DIVERTICULITIS: Status: ACTIVE | Noted: 2022-06-28

## 2022-07-09 PROBLEM — D56.3 BETA THALASSEMIA MINOR: Status: ACTIVE | Noted: 2019-12-31

## 2022-07-09 PROBLEM — K80.20 CALCULUS OF GALLBLADDER: Status: ACTIVE | Noted: 2017-12-14

## 2022-07-09 PROBLEM — L90.0 LICHEN SCLEROSUS: Status: ACTIVE | Noted: 2022-01-25

## 2022-07-09 PROBLEM — D12.6 ADENOMA OF COLON: Status: ACTIVE | Noted: 2022-02-21

## 2022-07-09 PROBLEM — S06.0X0A CONCUSSION WITHOUT LOSS OF CONSCIOUSNESS: Status: ACTIVE | Noted: 2018-12-10

## 2022-07-09 LAB
ALBUMIN SERPL-MCNC: 4.1 G/DL (ref 3.5–5)
ALP SERPL-CCNC: 69 U/L (ref 45–120)
ALT SERPL W P-5'-P-CCNC: 50 U/L (ref 0–45)
ANION GAP SERPL CALCULATED.3IONS-SCNC: 8 MMOL/L (ref 5–18)
ANION GAP SERPL CALCULATED.3IONS-SCNC: 8 MMOL/L (ref 5–18)
AST SERPL W P-5'-P-CCNC: 39 U/L (ref 0–40)
BASOPHILS # BLD AUTO: 0.1 10E3/UL (ref 0–0.2)
BASOPHILS # BLD AUTO: 0.1 10E3/UL (ref 0–0.2)
BASOPHILS NFR BLD AUTO: 1 %
BASOPHILS NFR BLD AUTO: 2 %
BILIRUB DIRECT SERPL-MCNC: 0.1 MG/DL
BILIRUB SERPL-MCNC: 0.4 MG/DL (ref 0–1)
BNP SERPL-MCNC: 10 PG/ML (ref 0–93)
BUN SERPL-MCNC: 5 MG/DL (ref 8–22)
BUN SERPL-MCNC: 6 MG/DL (ref 8–22)
C REACTIVE PROTEIN LHE: 0.1 MG/DL (ref 0–?)
CALCIUM SERPL-MCNC: 10 MG/DL (ref 8.5–10.5)
CALCIUM SERPL-MCNC: 9.7 MG/DL (ref 8.5–10.5)
CHLORIDE BLD-SCNC: 108 MMOL/L (ref 98–107)
CHLORIDE BLD-SCNC: 109 MMOL/L (ref 98–107)
CO2 SERPL-SCNC: 23 MMOL/L (ref 22–31)
CO2 SERPL-SCNC: 23 MMOL/L (ref 22–31)
CREAT SERPL-MCNC: 0.76 MG/DL (ref 0.6–1.1)
CREAT SERPL-MCNC: 0.77 MG/DL (ref 0.6–1.1)
EOSINOPHIL # BLD AUTO: 0.1 10E3/UL (ref 0–0.7)
EOSINOPHIL # BLD AUTO: 0.2 10E3/UL (ref 0–0.7)
EOSINOPHIL NFR BLD AUTO: 2 %
EOSINOPHIL NFR BLD AUTO: 4 %
ERYTHROCYTE [DISTWIDTH] IN BLOOD BY AUTOMATED COUNT: 16.2 % (ref 10–15)
ERYTHROCYTE [DISTWIDTH] IN BLOOD BY AUTOMATED COUNT: 17.2 % (ref 10–15)
GFR SERPL CREATININE-BSD FRML MDRD: 88 ML/MIN/1.73M2
GFR SERPL CREATININE-BSD FRML MDRD: 89 ML/MIN/1.73M2
GLUCOSE BLD-MCNC: 95 MG/DL (ref 70–125)
GLUCOSE BLD-MCNC: 98 MG/DL (ref 70–125)
HCT VFR BLD AUTO: 37.1 % (ref 35–47)
HCT VFR BLD AUTO: 38.4 % (ref 35–47)
HGB BLD-MCNC: 11.3 G/DL (ref 11.7–15.7)
HGB BLD-MCNC: 11.6 G/DL (ref 11.7–15.7)
IMM GRANULOCYTES # BLD: 0 10E3/UL
IMM GRANULOCYTES # BLD: 0 10E3/UL
IMM GRANULOCYTES NFR BLD: 0 %
IMM GRANULOCYTES NFR BLD: 0 %
LYMPHOCYTES # BLD AUTO: 2.1 10E3/UL (ref 0.8–5.3)
LYMPHOCYTES # BLD AUTO: 2.3 10E3/UL (ref 0.8–5.3)
LYMPHOCYTES NFR BLD AUTO: 34 %
LYMPHOCYTES NFR BLD AUTO: 45 %
MAGNESIUM SERPL-MCNC: 2.1 MG/DL (ref 1.8–2.6)
MCH RBC QN AUTO: 20 PG (ref 26.5–33)
MCH RBC QN AUTO: 20.4 PG (ref 26.5–33)
MCHC RBC AUTO-ENTMCNC: 30.2 G/DL (ref 31.5–36.5)
MCHC RBC AUTO-ENTMCNC: 30.5 G/DL (ref 31.5–36.5)
MCV RBC AUTO: 66 FL (ref 78–100)
MCV RBC AUTO: 67 FL (ref 78–100)
MONOCYTES # BLD AUTO: 0.4 10E3/UL (ref 0–1.3)
MONOCYTES # BLD AUTO: 0.4 10E3/UL (ref 0–1.3)
MONOCYTES NFR BLD AUTO: 6 %
MONOCYTES NFR BLD AUTO: 8 %
NEUTROPHILS # BLD AUTO: 2.1 10E3/UL (ref 1.6–8.3)
NEUTROPHILS # BLD AUTO: 3.6 10E3/UL (ref 1.6–8.3)
NEUTROPHILS NFR BLD AUTO: 41 %
NEUTROPHILS NFR BLD AUTO: 57 %
NRBC # BLD AUTO: 0 10E3/UL
NRBC # BLD AUTO: 0 10E3/UL
NRBC BLD AUTO-RTO: 0 /100
NRBC BLD AUTO-RTO: 0 /100
PLATELET # BLD AUTO: 336 10E3/UL (ref 150–450)
PLATELET # BLD AUTO: 337 10E3/UL (ref 150–450)
POTASSIUM BLD-SCNC: 3.8 MMOL/L (ref 3.5–5)
POTASSIUM BLD-SCNC: 3.8 MMOL/L (ref 3.5–5)
PROT SERPL-MCNC: 7.4 G/DL (ref 6–8)
RBC # BLD AUTO: 5.54 10E6/UL (ref 3.8–5.2)
RBC # BLD AUTO: 5.79 10E6/UL (ref 3.8–5.2)
SODIUM SERPL-SCNC: 139 MMOL/L (ref 136–145)
SODIUM SERPL-SCNC: 140 MMOL/L (ref 136–145)
TROPONIN I SERPL-MCNC: <0.01 NG/ML (ref 0–0.29)
TSH SERPL DL<=0.005 MIU/L-ACNC: 0.91 UIU/ML (ref 0.3–5)
WBC # BLD AUTO: 5 10E3/UL (ref 4–11)
WBC # BLD AUTO: 6.2 10E3/UL (ref 4–11)

## 2022-07-09 PROCEDURE — 85025 COMPLETE CBC W/AUTO DIFF WBC: CPT | Performed by: EMERGENCY MEDICINE

## 2022-07-09 PROCEDURE — 84484 ASSAY OF TROPONIN QUANT: CPT | Performed by: EMERGENCY MEDICINE

## 2022-07-09 PROCEDURE — 84484 ASSAY OF TROPONIN QUANT: CPT | Mod: 91 | Performed by: EMERGENCY MEDICINE

## 2022-07-09 PROCEDURE — 258N000003 HC RX IP 258 OP 636: Performed by: EMERGENCY MEDICINE

## 2022-07-09 PROCEDURE — 93005 ELECTROCARDIOGRAM TRACING: CPT | Performed by: EMERGENCY MEDICINE

## 2022-07-09 PROCEDURE — 36415 COLL VENOUS BLD VENIPUNCTURE: CPT | Performed by: EMERGENCY MEDICINE

## 2022-07-09 PROCEDURE — 80053 COMPREHEN METABOLIC PANEL: CPT | Performed by: EMERGENCY MEDICINE

## 2022-07-09 PROCEDURE — 99284 EMERGENCY DEPT VISIT MOD MDM: CPT | Mod: 25

## 2022-07-09 PROCEDURE — 250N000009 HC RX 250: Performed by: EMERGENCY MEDICINE

## 2022-07-09 PROCEDURE — 82248 BILIRUBIN DIRECT: CPT | Performed by: EMERGENCY MEDICINE

## 2022-07-09 PROCEDURE — 82310 ASSAY OF CALCIUM: CPT | Performed by: EMERGENCY MEDICINE

## 2022-07-09 PROCEDURE — 250N000013 HC RX MED GY IP 250 OP 250 PS 637: Performed by: EMERGENCY MEDICINE

## 2022-07-09 PROCEDURE — 83880 ASSAY OF NATRIURETIC PEPTIDE: CPT | Performed by: EMERGENCY MEDICINE

## 2022-07-09 PROCEDURE — 84443 ASSAY THYROID STIM HORMONE: CPT | Performed by: EMERGENCY MEDICINE

## 2022-07-09 PROCEDURE — 71046 X-RAY EXAM CHEST 2 VIEWS: CPT

## 2022-07-09 PROCEDURE — 250N000011 HC RX IP 250 OP 636: Performed by: EMERGENCY MEDICINE

## 2022-07-09 PROCEDURE — 83735 ASSAY OF MAGNESIUM: CPT | Performed by: EMERGENCY MEDICINE

## 2022-07-09 PROCEDURE — 96374 THER/PROPH/DIAG INJ IV PUSH: CPT

## 2022-07-09 PROCEDURE — 86140 C-REACTIVE PROTEIN: CPT | Performed by: EMERGENCY MEDICINE

## 2022-07-09 RX ORDER — METOCLOPRAMIDE HYDROCHLORIDE 5 MG/ML
10 INJECTION INTRAMUSCULAR; INTRAVENOUS ONCE
Status: COMPLETED | OUTPATIENT
Start: 2022-07-09 | End: 2022-07-09

## 2022-07-09 RX ORDER — ASPIRIN 81 MG/1
162 TABLET, CHEWABLE ORAL ONCE
Status: COMPLETED | OUTPATIENT
Start: 2022-07-09 | End: 2022-07-09

## 2022-07-09 RX ADMIN — LIDOCAINE HYDROCHLORIDE 30 ML: 20 SOLUTION ORAL; TOPICAL at 08:06

## 2022-07-09 RX ADMIN — SODIUM CHLORIDE, POTASSIUM CHLORIDE, SODIUM LACTATE AND CALCIUM CHLORIDE 1000 ML: 600; 310; 30; 20 INJECTION, SOLUTION INTRAVENOUS at 21:30

## 2022-07-09 RX ADMIN — ASPIRIN 81 MG CHEWABLE TABLET 162 MG: 81 TABLET CHEWABLE at 08:54

## 2022-07-09 RX ADMIN — METOCLOPRAMIDE HYDROCHLORIDE 10 MG: 5 INJECTION INTRAMUSCULAR; INTRAVENOUS at 21:28

## 2022-07-09 ASSESSMENT — ENCOUNTER SYMPTOMS
CONFUSION: 1
ABDOMINAL PAIN: 0
HEADACHES: 1
DIARRHEA: 1

## 2022-07-09 NOTE — ED TRIAGE NOTES
Pt arrives via EMS with CP that awoke her around 1 am from sleep. Pain radiated into left jaw and left shoulder. Pt has hx of anxiety but reports that to normally cause right sided chest pain. Pain currently 2/10 and reported as pressured. Pt was given 1 Nitroglycerin and 4 x baby ASA en route. IV established and patent.      Triage Assessment     Row Name 07/09/22 0536       Triage Assessment (Adult)    Airway WDL WDL       Respiratory WDL    Respiratory WDL WDL       Skin Circulation/Temperature WDL    Skin Circulation/Temperature WDL WDL       Cardiac WDL    Cardiac WDL X;chest pain;rhythm       Chest Pain Assessment    Chest Pain Location midsternal    Chest Pain Radiation jaw;shoulder    Character pressure    Associated Signs/Symptoms anxiety;bradycardia       Peripheral/Neurovascular WDL    Peripheral Neurovascular WDL WDL       Cognitive/Neuro/Behavioral WDL    Cognitive/Neuro/Behavioral WDL WDL

## 2022-07-09 NOTE — ED PROVIDER NOTES
EMERGENCY DEPARTMENT ENCOUNTER      NAME: Alejandra Kumar  AGE: 61 year old female  YOB: 1960  MRN: [id number]  EVALUATION DATE & TIME: 2022  5:26 AM    PCP: No Ref-Primary, Physician    ED PROVIDER: Zander Ribera D.O.      Chief Complaint   Patient presents with     Chest Pain       FINAL IMPRESSION:  1. Nonspecific chest pain        ED COURSE & MEDICAL DECISION MAKIN:20 AM I met with the patient to gather history and to perform my initial exam. I discussed the plan for care while in the Emergency Department.  8:36 AM I spoke with patient regarding status and decided to repeat troponin. She stated that the GI cocktail relieved some pain.   10:28 AM I spoke with patient regarding discharge plans to which she was agreeable with.         Pertinent Labs & Imaging studies reviewed. (See chart for details)  61 year old female presents to the Emergency Department for evaluation of chest pain and pressure with radiation to the back without associated shortness of breath or lightheadedness.  Patient did report the pain would get worse with laying down and had some improvement with a GI cocktail.  EKG was unremarkable and 2 troponins greater than 3 hours apart were both negative.  Chest x-ray did not show any evidence of acute pathology of the kidneys explain her symptoms.  Clinically there is no concern for PE, dissection, pneumothorax, and ACS is unlikely but will refer to [facility] for further management.  Patient was agreeable with the plan.  Return precautions discussed.    At the conclusion of the encounter I discussed the results of all of the tests and the disposition. The questions were answered. The patient or family acknowledged understanding and was agreeable with the care plan.        HPI    Patient information was obtained from: Patient    Use of : N/A       Alejandra Kumar is a 61 year old female who presents to the ED with chest pain.    Patient states  that she was unable to sleep last night due to pressure in the middle of her chest.  Pain is diminished when she is moving or sitting don but worsens when she lies down. Patient notes that when she is lying down she feels chills up into her neck. Patient states that she has been feeling extremely weak and tired which makes it difficult for her to walk. Patient endorses diarrhea associated with diverticulitis. Patient denies shortness of breath, vomiting, nausea, abdominal pain, tingling, and numbness.     Of note, patient has no other medical issues but does state she has anxiety medications that she does not currently take. Patient has had a bilateral cataract surgery.      REVIEW OF SYSTEMS  Constitutional:  Denies fever, chills, weight loss. Endorses weakness, fatigue, and chills (neck)  Eyes:  No pain, discharge, redness  HENT:  Denies sore throat, ear pain, congestion  Respiratory: No SOB, wheeze or cough  Cardiovascular:  No CP, palpitations  GI:  Denies abdominal pain, nausea, vomiting. Endorses diarrhea (diverticulitis)  : Denies dysuria, hematuria  Musculoskeletal:  Denies any new muscle/joint pain, swelling or loss of function.  Skin:  Denies rash, pallor  Neurologic:  Denies headache, focal weakness or sensory changes  Lymph: Denies swollen nodes    All other systems negative unless noted in HPI.    PAST MEDICAL HISTORY:  History reviewed. No pertinent past medical history.    PAST SURGICAL HISTORY:  Past Surgical History:   Procedure Laterality Date     CATARACT IOL, RT/LT Bilateral          CURRENT MEDICATIONS:    No current facility-administered medications for this encounter.     Current Outpatient Medications   Medication     acetaminophen (TYLENOL) 500 MG tablet     propranolol (INDERAL) 10 MG tablet         ALLERGIES:  No Known Allergies    FAMILY HISTORY:  Family History   Problem Relation Age of Onset     Cancer Mother      Cancer Maternal Grandmother      Cancer Paternal Grandmother       "Diabetes No family hx of      Glaucoma No family hx of      Hypertension No family hx of        SOCIAL HISTORY:  Social History     Socioeconomic History     Marital status:    Tobacco Use     Smoking status: Never Smoker     Smokeless tobacco: Never Used   Substance and Sexual Activity     Alcohol use: Never     Drug use: Never       VITALS:  Patient Vitals for the past 24 hrs:   BP Temp Temp src Pulse Resp SpO2 Height Weight   07/09/22 0945 (!) 183/91 -- -- 51 16 -- -- --   07/09/22 0616 (!) 144/63 -- -- 52 20 99 % -- --   07/09/22 0600 (!) 145/92 -- -- 52 21 97 % -- --   07/09/22 0545 (!) 139/92 -- -- 54 21 99 % -- --   07/09/22 0531 132/84 97.1  F (36.2  C) Temporal 54 18 98 % 1.6 m (5' 3\") 72.6 kg (160 lb)       PHYSICAL EXAM    VITAL SIGNS: BP (!) 183/91 (BP Location: Left arm)   Pulse 51   Temp 97.1  F (36.2  C) (Temporal)   Resp 16   Ht 1.6 m (5' 3\")   Wt 72.6 kg (160 lb)   SpO2 99%   BMI 28.34 kg/m      General Appearance: Well-appearing, well-nourished, no acute distress   Head:  Normocephalic, without obvious abnormality, atraumatic  Eyes:  PERRL, conjunctiva/corneas clear, EOM's intact,  ENT:  Lips, mucosa, and tongue normal, membranes are moist without pallor  Neck:  Normal ROM, symmetrical, trachea midline    Cardio:  Regular rate and rhythm, no murmur, rub or gallop, 2+ pulses symmetric in all extremities  Pulm:  Clear to auscultation bilaterally, respirations unlabored,  Abdomen:  Soft, non-tender, no rebound or guarding.  Musculoskeletal: Full ROM, no edema, no cyanosis, good ROM of major joints  Integument:  Warm, Dry, No erythema, No rash.    Neurologic:  Alert & oriented.  No focal deficits appreciated.  Ambulatory.  Psychiatric:  Affect normal, Judgment normal, Mood normal.      LABS  Results for orders placed or performed during the hospital encounter of 07/09/22 (from the past 24 hour(s))   CBC with platelets + differential    Narrative    The following orders were created for " panel order CBC with platelets + differential.  Procedure                               Abnormality         Status                     ---------                               -----------         ------                     CBC with platelets and d...[[id number]]  Abnormal            Final result                 Please view results for these tests on the individual orders.   Basic metabolic panel   Result Value Ref Range    Sodium 140 136 - 145 mmol/L    Potassium 3.8 3.5 - 5.0 mmol/L    Chloride 109 (H) 98 - 107 mmol/L    Carbon Dioxide (CO2) 23 22 - 31 mmol/L    Anion Gap 8 5 - 18 mmol/L    Urea Nitrogen 6 (L) 8 - 22 mg/dL    Creatinine 0.76 0.60 - 1.10 mg/dL    Calcium 9.7 8.5 - 10.5 mg/dL    Glucose 95 70 - 125 mg/dL    GFR Estimate 89 >60 mL/min/1.73m2   Troponin I (now)   Result Value Ref Range    Troponin I <0.01 0.00 - 0.29 ng/mL   B-Type Natriuretic Peptide ([facility] Only)   Result Value Ref Range    BNP 10 0 - 93 pg/mL   CBC with platelets and differential   Result Value Ref Range    WBC Count 5.0 4.0 - 11.0 10e3/uL    RBC Count 5.79 (H) 3.80 - 5.20 10e6/uL    Hemoglobin 11.6 (L) 11.7 - 15.7 g/dL    Hematocrit 38.4 35.0 - 47.0 %    MCV 66 (L) 78 - 100 fL    MCH 20.0 (L) 26.5 - 33.0 pg    MCHC 30.2 (L) 31.5 - 36.5 g/dL    RDW 17.2 (H) 10.0 - 15.0 %    Platelet Count 336 150 - 450 10e3/uL    % Neutrophils 41 %    % Lymphocytes 45 %    % Monocytes 8 %    % Eosinophils 4 %    % Basophils 2 %    % Immature Granulocytes 0 %    NRBCs per 100 WBC 0 <1 /100    Absolute Neutrophils 2.1 1.6 - 8.3 10e3/uL    Absolute Lymphocytes 2.3 0.8 - 5.3 10e3/uL    Absolute Monocytes 0.4 0.0 - 1.3 10e3/uL    Absolute Eosinophils 0.2 0.0 - 0.7 10e3/uL    Absolute Basophils 0.1 0.0 - 0.2 10e3/uL    Absolute Immature Granulocytes 0.0 <=0.4 10e3/uL    Absolute NRBCs 0.0 10e3/uL   Chest XR,  PA & LAT    Narrative    EXAM: XR CHEST 2 VW  LOCATION: [facility]  DATE/TIME: 7/9/2022 7:25 AM    INDICATION: chest  pain  COMPARISON: None.      Impression    IMPRESSION: Negative chest.   Troponin I (now)   Result Value Ref Range    Troponin I <0.01 0.00 - 0.29 ng/mL         RADIOLOGY  Chest XR,  PA & LAT   Final Result   IMPRESSION: Negative chest.             EKG:    Rate: 50bpm  Rhythm: Normal Sinus Rhythm  Axis: Normal  Interval: Normal  Conduction: Normal  QRS: Normal  ST: Normal  T-wave: Normal  QT: Not prolonged  Comparison EKG: no significant change compared to 30 Aug 2021  Impression:  No acute ischemic change   I have independently reviewed and interpreted today's EKG, pending Cardiologist read          MEDICATIONS GIVEN IN THE EMERGENCY:  Medications   lidocaine (viscous) (XYLOCAINE) 2 % 15 mL, alum & mag hydroxide-simethicone (MAALOX) 15 mL GI Cocktail (30 mLs Oral Given 7/9/22 0806)   aspirin (ASA) chewable tablet 162 mg (162 mg Oral Given 7/9/22 0854)       NEW PRESCRIPTIONS STARTED AT TODAY'S ER VISIT  Discharge Medication List as of 7/9/2022 10:41 AM         The creation of this record is based on the scribe s observations of the work being performed by Zander Ribera D.O. and the provider s statements to them. This document has been checked and approved by myself and is found to be accurate.    Zander Ribera D.O.  Emergency Medicine  [facility] EMERGENCY ROOM  [address] [facility] [address] [facility] [address] [phone]  Dept: [phone]     Zander Ribera DO  07/09/22 1237

## 2022-07-09 NOTE — TELEPHONE ENCOUNTER
Triage call:     Patient calling after being discharged from [facility] ED.   She was diagnosed with indigestion   Since discharge she reports new symptoms of tingling in her right arm and right leg. These symptoms were not present while she was in the ER.   She is requesting a prescription be sent for indigestion to her pharmacy.    [facility]   She was advised to be assessed within 4 hours or to contact her PCP for additional recommendations, advice or prescriptions. She verbalizes understanding and agreement with this plan.     Savi Mon RN   07/09/22 2:44 PM  [facility] Nurse Advisor  Reason for Disposition    [1] Tingling (e.g., pins and needles) of the face, arm / hand, or leg / foot on one side of the body AND [2] present now    Additional Information    Negative: [1] SEVERE weakness (i.e., unable to walk or barely able to walk, requires support) AND [2] new onset or worsening    Negative: [1] Weakness (i.e., paralysis, loss of muscle strength) of the face, arm / hand, or leg / foot on one side of the body AND [2] sudden onset AND [3] present now    Negative: [1] Numbness (i.e., loss of sensation) of the face, arm / hand, or leg / foot on one side of the body AND [2] sudden onset AND [3] present now    Negative: [1] Loss of speech or garbled speech AND [2] sudden onset AND [3] present now    Negative: Difficult to awaken or acting confused (e.g., disoriented, slurred speech)    Negative: Sounds like a life-threatening emergency to the triager    Negative: Headache  (and neurologic deficit)    Negative: [1] Back pain AND [2] numbness (loss of sensation) in groin or rectal area    Negative: [1] Unable to urinate (or only a few drops) > 4 hours AND [2] bladder feels very full (e.g., palpable bladder or strong urge to urinate)    Negative: [1] Loss of bladder or bowel control (urine or bowel incontinence; wetting self, leaking stool) AND [2] new onset    Negative: [1] Loss  of speech or garbled speech AND [2] gradual onset (e.g., days to weeks) AND [3] present now    Negative: [1] Numbness (i.e., loss of sensation) of the face, arm / hand, or leg / foot on one side of the body AND [2] gradual onset (e.g., days to weeks) AND [3] present now    Negative: Back pain (and neurologic deficit)    Negative: Neck pain (and neurologic deficit)    Negative: [1] Weakness of the face, arm / hand, or leg / foot on one side of the body AND [2] gradual onset (e.g., days to weeks) AND [3] present now    Negative: [1] Weakness (i.e., paralysis, loss of muscle strength) of the face, arm / hand, or leg / foot on one side of the body AND [2] sudden onset AND [3] brief (now gone)    Negative: [1] Numbness (i.e., loss of sensation) of the face, arm / hand, or leg / foot on one side of the body AND [2] sudden onset AND [3] brief (now gone)    Negative: [1] Loss of speech or garbled speech AND [2] sudden onset AND [3] brief (now gone)    Negative: Bell's palsy suspected (i.e., weakness on only one side of the face, developing over hours to days, no other symptoms)    Negative: Patient sounds very sick or weak to the triager    Protocols used: NEUROLOGIC DEFICIT-A-AH    COVID 19 Nurse Triage Plan/Patient Instructions    Please be aware that novel coronavirus (COVID-19) may be circulating in the community. If you develop symptoms such as fever, cough, or SOB or if you have concerns about the presence of another infection including coronavirus (COVID-19), please contact your health care provider or visit [email or web address].     Disposition/Instructions    In-Person Visit with provider recommended. Reference Visit Selection Guide.    Thank you for taking steps to prevent the spread of this virus.  o Limit your contact with others.  o Wear a simple mask to cover your cough.  o Wash your hands well and often.    Resources    [facility]: About COVID-19: [email or web address]    CDC: What  to Do If You're Sick: www.cdc.gov/coronavirus/2019-ncov/about/steps-when-sick.html    CDC: Ending Home Isolation: www.cdc.gov/coronavirus/2019-ncov/hcp/disposition-in-home-patients.html     CDC: Caring for Someone: www.cdc.gov/coronavirus/2019-ncov/if-you-are-sick/care-for-someone.html     [facility]: Interim Guidance for Hospital Discharge to Home: www.[facility].Counts include 234 beds at the [facility].[place]./diseases/coronavirus/hcp/hospdischarge.pdf    [facility] clinical trials (COVID-19 research studies): clinicalaffairs.[facility]-clinical-trials     Below are the COVID-19 hotlines at the [place] [facility] ([facility]). Interpreters are available.   o For health questions: Call 462-473-7465 or 1-439.381.3338 (7 a.m. to 7 p.m.)  o For questions about schools and childcare: Call 627-312-0652 or 1-509.368.3183 (7 a.m. to 7 p.m.)

## 2022-07-10 LAB
ATRIAL RATE - MUSE: 59 BPM
DIASTOLIC BLOOD PRESSURE - MUSE: 76 MMHG
INTERPRETATION ECG - MUSE: NORMAL
P AXIS - MUSE: 39 DEGREES
PR INTERVAL - MUSE: 162 MS
QRS DURATION - MUSE: 84 MS
QT - MUSE: 446 MS
QTC - MUSE: 441 MS
R AXIS - MUSE: -32 DEGREES
SYSTOLIC BLOOD PRESSURE - MUSE: 134 MMHG
T AXIS - MUSE: 15 DEGREES
VENTRICULAR RATE- MUSE: 59 BPM

## 2022-07-10 NOTE — ED NOTES
Asked pt about her visit today.  Pt started to explain that she was here this morning and took an antibiotic for something that she had been admitted for a couple weeks ago.  Pt states that she didn't really feel the best when sh eleft this morning but left anyways.  Pt then stated that she was told to follow with her regular dr.  Pt states that she made a bunch of calls today and the first few told her to follow up with her dr but she finally got some care line to say she should be seen in the er.  Pt then started asking about insurance and nurse stated that he had no idea about insurances and that was just here to care for her.  Pt then became mad that he couldn't answer her questions.  Nurse asked pt if she has had an MRI before and she stated that she had.  Nurse stated that we would need to know if she had answers to the MRI questions and that it would help to know when and where she had an MRI.  Pt then stated that she didn't know the answers to them because said nurse was asking too many questions.  Went to get MRI checklist and when nurse returned she stated that she wanted to leave.

## 2022-07-10 NOTE — ED NOTES
Pt want iv fluid off and IV out as she wanted to leave.   was made aware and was with another pt so he wasn't able to come see the pt right away.  Pt refused to sign AMA paperwork.

## 2022-07-10 NOTE — ED TRIAGE NOTES
"Pt presents to the ED via EMS for leg weakness. EMS states that they brought pt in this morning for CP, was discharged, and they were called again this evening. EMS states that she is very anxious compared to this morning. EMS reports that pt complains of fatigue and weakness, was able to walk out of her apartment on her own. Pt reports that she has been taking ciprofloxacin and metronidazole since 6/29, and she gets \"a funny feeling after taking the meds\". Pt states that she has tingling on right side, in leg. Pt has hx of diverticulitis with abscess, endorses diarrhea.     Triage Assessment     Row Name 07/09/22 1947       Triage Assessment (Adult)    Airway WDL WDL       Respiratory WDL    Respiratory WDL WDL       Skin Circulation/Temperature WDL    Skin Circulation/Temperature WDL WDL       Cardiac WDL    Cardiac WDL WDL       Peripheral/Neurovascular WDL    Peripheral Neurovascular WDL X;neurovascular assessment lower       LLE Neurovascular Assessment    Temperature LLE cool    Sensation LLE no tingling;no tenderness;no numbness       RLE Neurovascular Assessment    Temperature RLE cool    Sensation RLE tingling present;no tenderness;no numbness       Cognitive/Neuro/Behavioral WDL    Cognitive/Neuro/Behavioral WDL WDL              "

## 2022-07-10 NOTE — ED PROVIDER NOTES
EMERGENCY DEPARTMENT ENCOUNTER      NAME: Alejandra Kumar  AGE: 61 year old female  YOB: 1960  MRN: [id number]  EVALUATION DATE & TIME: No admission date for patient encounter.    PCP: No Ref-Primary, Physician    ED PROVIDER: Papito Lopez M.D.      Chief Complaint   Patient presents with     Extremity Weakness     Fatigue         IMPRESSION  1. Numbness and tingling of right arm and leg    2. Nonintractable headache, unspecified chronicity pattern, unspecified headache type        PLAN  - patient left the ED prior to obtaining MRI or discussing risks of leaving the ED with me    ED COURSE & MEDICAL DECISION MAKING    ED Course as of 07/09/22 2225   Sat Jul 09, 2022 2036 61yoF with history of anxiety, diverticulitis (today was day 13 of cipro+Flagyl) who was seen in the ED earlier today for nonspecific chest pain; troponin negative x2 and sent home. Reports she got home, took her antibiotics, then developed tingling to right arm & leg; no weakness. Mild right-sided headache. Called GI clinic and told to stop her antibiotics (had completed appropriate course, no further antibiotics needed) and go to the ED. Thus called EMS.    SBP 160s on presentation with otherwise normal vitals. Anxious on exam with normal neuro exam at this time (sensation symmetric bilaterally with no weakness, CN2-12 intact, negative pronator drift, no ankle clonus), clear lungs, normal work of breathing, benign abdomen, no meningismus, no scalp or temporal tenderness. Outside tPA window for stroke with NIHSS 0; not tier 1 stroke. NIHSS 0 so not tier 2 either. Anxiety likely playing large role. Will obtain MRI for definition while checking EKG, blood & given IVF, Reglan for mild headache. Doubt meningitis. Electrolyte issue with mild ongoing diarrhea with diarrhea could be playing a role. Patient comfortable with this plan; no further questions at this time.   2221 Labs with no glaring electrolyte abnormality, no  TRINO, hemoglobin 11.2, no leukocytosis, negative CRP. Doubt infectious/inflammatory process. EKG unremarkable with no changes from earlier today. Troponin still pending at this time. I was informed by ED RN that patient stated she wanted to leave the ED and not obtain MRI; patient left the ED prior to me discussing the risks of this decision with her.       --------------------------------------------------------------------------------   --------------------------------------------------------------------------------     7:53 PM I met with the patient in triage for the initial interview and physical examination. Discussed plan for treatment and workup in the ED.  10:02 PM Nurse informed me that the patient did not want the MRI, she wanted to go home. Patient left the emergency department. Did not sign AMA paperwork.      This patient involved a high degree of complexity in medical decision making, as significant risks were present and assessed.    Broad differential considered for this patient presenting, including but not limited to:  Stroke, TIA, electrolyte derangement, TRINO, ACS, anemia, anxiety    I wore the following PPE during this patient encounter:  N95 mask, face shield    MEDICATIONS GIVEN IN THE EMERGENCY DEPARTMENT  Medications   metoclopramide (REGLAN) injection 10 mg (10 mg Intravenous Given 7/9/22 2128)   lactated ringers BOLUS 1,000 mL (1,000 mLs Intravenous New Bag 7/9/22 2130)       NEW PRESCRIPTIONS STARTED AT TODAY'S ER VISIT  Discharge Medication List as of 7/9/2022 10:22 PM      CONTINUE these medications which have NOT CHANGED    Details   acetaminophen (TYLENOL) 500 MG tablet Take 500 mg by mouth, Historical      propranolol (INDERAL) 10 MG tablet TAKE 1 TABLET BY MOUTH DAILY AS NEEDED FOR ANXIETY, Historical                 =================================================================      HPI  Patient information was obtained from: Patient    Use of : N/A         [place]  Faheem Kumar is a 61 year old female with a pertinent history of diverticulitis, concussion with loss of consciousness, who presents to this ED via EMS for evaluation of paresthesias.    Patient reports right side numbness and paresthesias starting around noon today. Patient had been at this ED this morning for left-sided chest pain that radiated up her neck, then took her ciprofloxacin from a hospitalization on 6/26 for a diverticulitis abscess. About 10-15 minutes after taking her antibiotic, she noticed the paresthesias. She called [facility], who told her to stop taking the antibiotic and to get assessed for an aneurysm due to her headache and confusion. She states the headache was bad and she usually does not get headaches. Patient reports her right leg has tingling, it started at her shin, but now radiates up her whole leg, and causes a cold sensation. Patient reports her abdominal pain is gone, but she is still having diarrhea after her hospitalization in June. She notes she is unable to eat and is on a liquid diet. Patient notes a family history of aneurysm. Patient denies any other current complaints.    REVIEW OF SYSTEMS   Review of Systems   Gastrointestinal: Positive for diarrhea. Negative for abdominal pain. Constipation: chronic.   Neurological: Positive for headaches.        Positive for paresthesias.   Psychiatric/Behavioral: Positive for confusion.     All other systems reviewed and are negative except as noted above in HPI.          --------------- MEDICAL HISTORY ---------------  PAST MEDICAL HISTORY:  No past medical history on file.  Patient Active Problem List   Diagnosis     Anxiety     Diverticulitis     Adenoma of colon     Beta thalassemia minor     Calculus of gallbladder     Combined form of age-related cataract, left eye     Concussion without loss of consciousness     Depression     Family hx of colon cancer     H/O adenomatous polyp of colon     Lichen sclerosus     Pseudophakia, right  eye     Right posterior capsular opacification       PAST SURGICAL HISTORY:  Past Surgical History:   Procedure Laterality Date     CATARACT IOL, RT/LT Bilateral        CURRENT MEDICATIONS:    No current facility-administered medications for this encounter.    Current Outpatient Medications:      acetaminophen (TYLENOL) 500 MG tablet, Take 500 mg by mouth, Disp: , Rfl:      propranolol (INDERAL) 10 MG tablet, TAKE 1 TABLET BY MOUTH DAILY AS NEEDED FOR ANXIETY, Disp: , Rfl:     ALLERGIES:  No Known Allergies    FAMILY HISTORY:  Family History   Problem Relation Age of Onset     Cancer Mother      Cancer Maternal Grandmother      Cancer Paternal Grandmother      Diabetes No family hx of      Glaucoma No family hx of      Hypertension No family hx of        SOCIAL HISTORY:   Social History     Socioeconomic History     Marital status:    Tobacco Use     Smoking status: Never Smoker     Smokeless tobacco: Never Used   Substance and Sexual Activity     Alcohol use: Never     Drug use: Never       --------------- PHYSICAL EXAM ---------------  Nursing notes and vitals reviewed by me.  VITALS:  Vitals:    07/09/22 1945   BP: (!) 160/92   Pulse: 64   Resp: 16   Temp: 98.2  F (36.8  C)   TempSrc: Oral   SpO2: 99%   Weight: 72.6 kg (160 lb)       PHYSICAL EXAM:    General:  alert, interactive, no distress  Eyes:  conjunctivae clear, conjugate gaze  HENT:  atraumatic, nose with no rhinorrhea, oropharynx clear  Neck:  no meningismus  Cardiovascular:  HR in the 60s during exam, regular rhythm, no murmurs, brisk cap refill  Chest:  no chest wall tenderness  Pulmonary:  no stridor, normal phonation, normal work of breathing, clear lungs bilaterally  Abdomen:  soft, nondistended, nontender  :  no CVA tenderness  Back:  no midline spinal tenderness  Musculoskeletal:  no pretibial edema, no calf tenderness. Gross ROM intact to joints of extremities with no obvious deformities.  Skin:  warm, dry, no rash  Neuro:  awake,  alert, answers questions appropriately, follows commands, moves all limbs. Cranial nerves 2-12 intact. Negative pronator drift. 5/5 extremities. Sensation to light touch intact to all extremities. No tremor.  Psych:  Moderate anxious affect.      --------------- RESULTS ---------------  EKG:    Reviewed and interpreted by me.  - sinus bradycardia at 59bpm, no ST or T wave changes, normal intervals  - unchanged from earlier today  My read.    LAB:  Reviewed and interpreted by me.  Results for orders placed or performed during the hospital encounter of 07/09/22   Result Value Ref Range    Troponin I <0.01 0.00 - 0.29 ng/mL   Basic metabolic panel   Result Value Ref Range    Sodium 139 136 - 145 mmol/L    Potassium 3.8 3.5 - 5.0 mmol/L    Chloride 108 (H) 98 - 107 mmol/L    Carbon Dioxide (CO2) 23 22 - 31 mmol/L    Anion Gap 8 5 - 18 mmol/L    Urea Nitrogen 5 (L) 8 - 22 mg/dL    Creatinine 0.77 0.60 - 1.10 mg/dL    Calcium 10.0 8.5 - 10.5 mg/dL    Glucose 98 70 - 125 mg/dL    GFR Estimate 88 >60 mL/min/1.73m2   Hepatic function panel   Result Value Ref Range    Bilirubin Total 0.4 0.0 - 1.0 mg/dL    Bilirubin Direct 0.1 <=0.5 mg/dL    Protein Total 7.4 6.0 - 8.0 g/dL    Albumin 4.1 3.5 - 5.0 g/dL    Alkaline Phosphatase 69 45 - 120 U/L    AST 39 0 - 40 U/L    ALT 50 (H) 0 - 45 U/L   CRP inflammation   Result Value Ref Range    CRP 0.1 0.0 - <0.8 mg/dL   Result Value Ref Range    Magnesium 2.1 1.8 - 2.6 mg/dL   TSH with free T4 reflex   Result Value Ref Range    TSH 0.91 0.30 - 5.00 uIU/mL   CBC with platelets and differential   Result Value Ref Range    WBC Count 6.2 4.0 - 11.0 10e3/uL    RBC Count 5.54 (H) 3.80 - 5.20 10e6/uL    Hemoglobin 11.3 (L) 11.7 - 15.7 g/dL    Hematocrit 37.1 35.0 - 47.0 %    MCV 67 (L) 78 - 100 fL    MCH 20.4 (L) 26.5 - 33.0 pg    MCHC 30.5 (L) 31.5 - 36.5 g/dL    RDW 16.2 (H) 10.0 - 15.0 %    Platelet Count 337 150 - 450 10e3/uL    % Neutrophils 57 %    % Lymphocytes 34 %    % Monocytes 6 %     % Eosinophils 2 %    % Basophils 1 %    % Immature Granulocytes 0 %    NRBCs per 100 WBC 0 <1 /100    Absolute Neutrophils 3.6 1.6 - 8.3 10e3/uL    Absolute Lymphocytes 2.1 0.8 - 5.3 10e3/uL    Absolute Monocytes 0.4 0.0 - 1.3 10e3/uL    Absolute Eosinophils 0.1 0.0 - 0.7 10e3/uL    Absolute Basophils 0.1 0.0 - 0.2 10e3/uL    Absolute Immature Granulocytes 0.0 <=0.4 10e3/uL    Absolute NRBCs 0.0 10e3/uL       RADIOLOGY:  Reviewed by me. Please see official radiology report.  Recent Results (from the past 24 hour(s))   Chest XR,  PA & LAT    Narrative    EXAM: XR CHEST 2 VW  LOCATION: [facility]  DATE/TIME: 7/9/2022 7:25 AM    INDICATION: chest pain  COMPARISON: None.      Impression    IMPRESSION: Negative chest.     MRI brain/neck w/ w/o contrast:  Patient left ED prior to obtaining this          I, Gay Kyle, am serving as a scribe to document services personally performed by Dr. Papito Lopez based on my observation and the provider's statements to me. I, Papito Lopez MD attest that Gay Kyle is acting in a scribe capacity, has observed my performance of the services and has documented them in accordance with my direction.      Papito Lopez MD  07/09/22  Emergency Medicine  [facility] EMERGENCY ROOM  [address] [facility] [id number] [phone]  Dept: [phone]     Papito Lopez MD  07/09/22 3148

## 2022-08-12 DIAGNOSIS — H35.341 MACULAR HOLE, RIGHT EYE: Primary | ICD-10-CM

## 2022-10-19 ENCOUNTER — HOSPITAL ENCOUNTER (EMERGENCY)
Facility: CLINIC | Age: 62
Discharge: HOME | End: 2022-10-20
Attending: EMERGENCY MEDICINE | Admitting: EMERGENCY MEDICINE
Payer: COMMERCIAL

## 2022-10-19 VITALS
WEIGHT: 165 LBS | OXYGEN SATURATION: 97 % | RESPIRATION RATE: 16 BRPM | DIASTOLIC BLOOD PRESSURE: 74 MMHG | HEART RATE: 72 BPM | SYSTOLIC BLOOD PRESSURE: 128 MMHG | BODY MASS INDEX: 29.23 KG/M2 | TEMPERATURE: 98 F | HEIGHT: 63 IN

## 2022-10-19 DIAGNOSIS — F43.20 ADJUSTMENT DISORDER, UNSPECIFIED TYPE: ICD-10-CM

## 2022-10-19 DIAGNOSIS — G47.00 INSOMNIA, UNSPECIFIED TYPE: ICD-10-CM

## 2022-10-19 PROCEDURE — 99285 EMERGENCY DEPT VISIT HI MDM: CPT | Mod: 25

## 2022-10-19 PROCEDURE — 90791 PSYCH DIAGNOSTIC EVALUATION: CPT

## 2022-10-19 ASSESSMENT — ACTIVITIES OF DAILY LIVING (ADL): ADLS_ACUITY_SCORE: 33

## 2022-10-20 ENCOUNTER — TELEPHONE (OUTPATIENT)
Dept: BEHAVIORAL HEALTH | Facility: CLINIC | Age: 62
End: 2022-10-20

## 2022-10-20 RX ORDER — ALPRAZOLAM 0.5 MG
0.5 TABLET ORAL 3 TIMES DAILY PRN
Qty: 8 TABLET | Refills: 0 | Status: SHIPPED | OUTPATIENT
Start: 2022-10-20 | End: 2023-10-13

## 2022-10-20 RX ORDER — TRAZODONE HYDROCHLORIDE 50 MG/1
50 TABLET, FILM COATED ORAL AT BEDTIME
Qty: 10 TABLET | Refills: 0 | Status: SHIPPED | OUTPATIENT
Start: 2022-10-20

## 2022-10-20 ASSESSMENT — ENCOUNTER SYMPTOMS
SLEEP DISTURBANCE: 1
NERVOUS/ANXIOUS: 1
FEVER: 0
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
## 2022-10-20 NOTE — TELEPHONE ENCOUNTER
Coordinator forwarded medication management referral to TC RN pool since next level of care is scheduled. Reply sent to referral source.     Cathleen Brewer  Transition Clinic Coordinator  Date and Time: 10/20/22 7:29 AM    ----- Message from Mandie Rockwell sent at 10/20/2022 12:36 AM CDT -----  Regarding: Referral  Transition Clinic Referral   [place] (Limited)        Please Check Type of Referral Requested:         _x___MEDICATION:  Referrals for Medication are ONLY accepted from the following areas (select): Emergency Department/Urgent Care                                               Referring Provider Contact Name: MANDIE ROCKWELL Phone Number: [phone]    Reason for Transition Clinic Referral: Medication management    Next Level of Care Patient Will Be Transitioned To: OP care  Date: Friday, 10/28/2022  Time: 9:00 am - 10:00 am  Provider: Kiera Babb MA  CNP,RN  Location: [facility], [address], [address], [place] [address]  Phone: [phone]  Type: Telepsychiatry    What Would Be Helpful from the Transition Clinic: High stress     Needs: NO    Does Patient Have Access to Technology: yes    Patient E-mail Address: No e-mail address on record    Current Patient Phone Number: [phone];    Clinician Gender Preference (if applicable): no    Patient location preference: [place]
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
## 2022-10-20 NOTE — DISCHARGE INSTRUCTIONS
Recommend use trazodone at night for insomnia.  Use Xanax every 8 hours needed for severe anxiety    You have been prescribed a benzodiazepine, xanax.  Side effects of this medication includessedation, unsteadiness, lightheadedness/dizziness, confusion, depression, or weakness.  These medications are addictive with long-term use, so please use sparingly.      Additionally, benzodiazepines are medicationsthat are sedating (will make you sleepy), so do not drive or operate machinery while taking this medication.  Avoid alcohol or other sedating medications, such as narcotics, while taking benzodiazepines due to risk forincreased sedation and difficulty breathing that could be life threatening.     If you are on this medication for a long period of time, please talk to your primary care provider before abruptly discontinuing thismedication due to risk of withdrawal.      [facility]  Referral made.  They will contact you directly for appointments to support while waiting for psychiatry starting 10/28/2022. If they do no reachout or if you need to change appointment or have further questions please contact them directly at [phone].       Scheduled Appointment  Date: Friday, 10/28/2022  Time: 9:00 am - 10:00 am  Provider: Kiera Babb MA, CNP,RN  Location: [facility], [address] [place] [address], [facility], [place] [address]  Phone: [phone]  Type: Telepsychiatry    Patient Instructions  Please fill New Patient Form by using following link. All forms need to be completed 72 hours prior to the appointment date/time by going to [email or web address] Please call us on [phone] 96 hours prior to your scheduled appointment to confirm that you are able to attend. We will provide you information about how to log into video call software when you call.    Safety Plan   If I am feeling unsafe or I am in a crisis, I will:  -Contact my established care  providers  -Call the National Suicide Prevention Lifeline: [phone]  -Go to the nearest emergency room  -Call 911     Warning signs that I or other people might notice when a crisis is developing for me:   -Decreased appetite,   -Decreased sleep  -Restlessness  -Increased irritability or agitation.      Things I am able to do on my own to cope or help me feel better:   -take a time-out. Practice yoga, listen to music, meditate, get a massage, or learn relaxation techniques. Stepping back from the problem helps clear your head.  -Eat well-balanced meals. Do not skip any meals. Do keep healthful, energy-boosting snacks on hand.  -Limit alcohol and caffeine  -Get enough sleep. When stressed, your body needs additional sleep and rest.   -Exercise daily to help you feel good and maintain your health.  -Accept that you cannot control everything. Put your stress in perspective: Is it really as bad as you think?   -Welcome humor. A good laugh goes a long way.     Things that I am able to do with others to cope or help me better:   -Listen and talk about concerns  -Continue to follow with outpatient care providers and case management   -Go for a walk  -Distract with going out to eat, to a movie or a park.      Things I can use or do for distraction:   -Watch a TV show. If you don't have cable or a subscription service, many television networks offer free access, without a log-in, until you get closer to the most recent episodes.     -Watch a movie. Light-hearted comedy, drama to suck you in, or an old favorite - there are countless films to whisk you away for a bit.     -Sing. It doesn't matter if you're a professional vocalist or can't to carry a tune, singing engages a completely different part of your brain. Plus, the vibrations in your chest give great sensory feedback and the vocalization reminds you of your voice.     -Watch cute videos on Eye Phone. About as low-effort as it gets: puppy/keegan videos, laugh  challenges, or Vine compilations - take your pick.     -Mindless doodles/finger painting/playing with cesilia. This may be especially helpful to those with child parts (DID/OSDD) who need an activity of their own.     -Grab a snack.     -Drum on a surface. Like singing, the vibrations and bilateral stimulation of your hands thumping will engage different parts of your mind and bring your attention away from what's intruding on you.     -Play a game or use a fun tom on your phone. Even if you aren't a , search the tom store. You might find one that speaks to you. It can be a great escape to get lost in for a bit.     -Tear out words/photos/etc for a collage. Ask a local doctor's office or hairdresser for their spare magazines. Mindlessly rip out photos and words that speak to you. (Bonus: you may get to put tabloids to good use for once! They often have the scathing, overdramatic words that happen to be great for a therapeutic collages. Shocking! Betrayal! You Won't Believe It!).     Discover new music. ThriveHive, [employer] Exchange, -Theodore, so many ways to find new gems!     -Wash your face/hands or brush your teeth. A quick refresher can help you restart your day on a brand new page.     -Re-watch highlights from your favorite sport. It's easy to forget just how many epic, captivating moments there were once some time has passed. Relive your excitement. Plus, you already know how it ends, so you don't have to pay super close attention!     -Gratitude list. When your mind only wants to remind you of distressing things, focusing on 10+ things you're grateful for can really take you to a whole new atmosphere in your mind and heart.     -Imagery exercises. Containment exercises, healing pool/healing light, guided meditation, so many options!     -Play a board game with a friend. Something simple like Sorry!, challenging like chess, or silly like Cards Against Humanity, there are lots of options to distract you in the company of  friends.     -Card games. Solo works, too, if there's no one around.     Changes I can make to support my mental health and wellness:  1. Value yourself:  Treat yourself with kindness and respect, and avoid self-criticism. Make time for your hobbies and favorite projects, or broaden your horizons. Do a daily crossword puzzle, plant a garden, take dance lessons, learn to play an instrument or become fluent in another language.     2. Take care of your body:  Taking care of yourself physically can improve your mental health. Be sure to:     Eat nutritious meals  Avoid smoking and vaping-- see Cessation Help  Drink plenty of water  Exercise, which helps decrease depression and anxiety and improve moods  Get enough sleep. Researchers believe that lack of sleep contributes to a high rate of depression in college students.     3. Surround yourself with good people:  People with strong family or social connections are generally healthier than those who lack a support network. Make plans with supportive family members and friends, or seek out activities where you can meet new people, such as a club, class or support group.     4. Give yourself:  Volunteer your time and energy to help someone else. You'll feel good about doing something tangible to help someone in need -- and it's a great way to meet new people. See Fun and Cheap Things to do in [place] for ideas.     5. Learn how to deal with stress:  Like it or not, stress is a part of life. Practice good coping skills: Try One-Minute Stress Strategies, do Figueroa Chi, exercise, take a nature walk, play with your pet or try journal writing as a stress reducer. Also, remember to smile and see the humor in life. Research shows that laughter can boost your immune system, ease pain, relax your body and reduce stress.     6. Quiet your mind:  Try meditating, Mindfulness and/or prayer. Relaxation exercises and prayer can improve your state of mind and outlook on life. In fact,  "research shows that meditation may help you feel calm and enhance the effects of therapy. To get connected, see spiritual resources on Personal Well-being for Students     7. Set realistic goals:  Decide what you want to achieve academically, professionally and personally, and write down the steps you need to realize your goals. Aim high, but be realistic and don't over-schedule. You'll enjoy a tremendous sense of accomplishment and self-worth as you progress toward your goal. Wellness Coaching, free to [employer] students, can help you develop goals and stay on track.      8. Break up the monotony:  Although our routines make us more efficient and enhance our feelings of security and safety, a little change of pace can perk up a tedious schedule. Alter your jogging route, plan a road-trip, take a walk in a different park, hang some new pictures or try a new restaurant.     9. Avoid alcohol and other drugs:  Keep alcohol use to a minimum and avoid other drugs. Sometimes people use alcohol and other drugs to \"self-medicate\" but in reality, alcohol and other drugs only aggravate problems.     10. Get help when you need it:  Seeking help is a sign of strength -- not a weakness. And it is important to remember that treatment is effective. People who get appropriate care can recover from mental illness and addiction and lead full, rewarding lives.     People in my life that I can ask for help:  -Daughter   -Friends  -Therapist   -Other Mental health Supportive Services     Your [facility] has a mental health crisis team you can call 24/7:   Phone: [phone]      Other things that are important when I m in crisis for myself or others to do:    -Keep your voice calm  -Avoid overreacting  -Listen to the person  -Express support and concern  -Avoid continuous eye contact  -Ask how you can help  -Keep stimulation level low  -Move slowly  -Offer options instead of trying to take control  -Avoid touching the person unless you ask " "  permission  -Be patient   -Gently announce actions before initiating them   -Give them space, don t make them feel   trapped   -Don t make judgmental comments  -Don t argue or try to reason with the person     Additional resources and information:   National [place] on Mental Illness ([place]) [place]: Connect for help, to navigate the mental health system, and for support and for resources.  Call: 5-010-CXGK-Helps / 4-541-000-9301     Crisis Text Line: The 24/7 emergency service is available if you or someone you know is experiencing a psychiatric or mental health crisis.  Text: \"[place]\" to 100852     [place] Warmline: Are you an adult needing support? Talk to a specialist who has firsthand experience living with a mental health condition.  Call: 544.178.8694   Text: \"Support\" to 18557     National Suicide Prevention Lifeline: The 24/7 lifeline provides support when in distress, has prevention and crisis resources for you or your loved ones, and resources for professionals.  Call 9-552-951-AFPG (2318)     Peer Support Connection Warmlines: Zqrk-uk-drya telephone support that s safe and supportive. Open 5 p.m. to 9 a.m.  Call or text: 1-505.426.5323      COVID Cares Stress Phone Support Service. Any [place] experiencing stress can call 264-JCJF9QV (217-317-7497) for free telephone support from 9am to 9pm every day. The service is a collaboration with volunteers from the [place] Psychiatric Society, the [place] Psychological Association, the [facility] Psychologists, and Mental [facility] [place]. The free service is also accessible at [place] where searchers can also find psychiatric and mental health services availability and real-time Substance Use Disorder Treatment program openings.  Mental Health Apps  My3  https://ShopAdvisorpp.org/     VirtualHopeBox  https://Trustifi.org/apps/virtual-hope-box/     Additional information  Today you were seen by a licensed mental health professional through " Triage and Transition services, Behavioral Healthcare Providers ([facility])  for a crisis assessment in the Emergency Department at [facility].  It is recommended that you follow up with your established providers (psychiatrist, mental health therapist, and/or primary care doctor - as relevant) as soon as possible. Coordinators from [facility] will be calling you in the next 24-48 hours to ensure that you have the resources you need.  You can also contact [facility] coordinators directly at [phone]. You may have been scheduled for or offered an appointment with a mental health provider. [facility] maintains an extensive network of licensed behavioral health providers to connect patients with the services they need.  We do not charge providers a fee to participate in our referral network.  We match patients with providers based on a patient's specific needs, insurance coverage, and location.  Our first effort will be to refer you to a provider within your care system, and will utilize providers outside your care system as needed.

## 2022-10-24 ENCOUNTER — TELEPHONE (OUTPATIENT)
Dept: BEHAVIORAL HEALTH | Facility: CLINIC | Age: 62
End: 2022-10-24

## 2022-10-24 NOTE — TELEPHONE ENCOUNTER
Transition Clinic Referral   [place] (Limited)         Please Check Type of Referral Requested:         _x___MEDICATION:  Referrals for Medication are ONLY accepted from the following areas (select): Emergency Department/Urgent Care                                                Referring Provider Contact Name: KARLA CARRASCO Phone Number: [phone]     Reason for Transition Clinic Referral: Medication management     Next Level of Care Patient Will Be Transitioned To: OP care   Date: Friday, 10/28/2022  Time: 9:00 am - 10:00 am   Provider: Kiera Babb MA  CNP,RN   Location: [facility], [address], [address], [place] [address]   Phone: [phone]   Type: Telepsychiatry     What Would Be Helpful from the Transition Clinic: High stress      Needs: NO     Does Patient Have Access to Technology: yes     Patient E-mail Address: No e-mail address on record     Current Patient Phone Number: [phone];     Clinician Gender Preference (if applicable): no     Patient location preference: Virtual     KARLA CARRASCO
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
## 2023-05-31 ENCOUNTER — HOSPITAL ENCOUNTER (EMERGENCY)
Facility: CLINIC | Age: 63
Discharge: HOME | End: 2023-06-01
Attending: EMERGENCY MEDICINE | Admitting: EMERGENCY MEDICINE
Payer: COMMERCIAL

## 2023-05-31 DIAGNOSIS — N30.90 BLADDER INFECTION: ICD-10-CM

## 2023-05-31 DIAGNOSIS — F41.9 ANXIETY: ICD-10-CM

## 2023-05-31 DIAGNOSIS — R55 SYNCOPE, UNSPECIFIED SYNCOPE TYPE: ICD-10-CM

## 2023-05-31 LAB
ALBUMIN SERPL-MCNC: 3.7 G/DL (ref 3.5–5)
ALP SERPL-CCNC: 77 U/L (ref 45–120)
ALT SERPL W P-5'-P-CCNC: 25 U/L (ref 0–45)
ANION GAP SERPL CALCULATED.3IONS-SCNC: 9 MMOL/L (ref 5–18)
AST SERPL W P-5'-P-CCNC: 21 U/L (ref 0–40)
BASOPHILS # BLD AUTO: 0.1 10E3/UL (ref 0–0.2)
BASOPHILS NFR BLD AUTO: 1 %
BILIRUB SERPL-MCNC: 0.3 MG/DL (ref 0–1)
BUN SERPL-MCNC: 20 MG/DL (ref 8–22)
CALCIUM SERPL-MCNC: 9.4 MG/DL (ref 8.5–10.5)
CHLORIDE BLD-SCNC: 111 MMOL/L (ref 98–107)
CO2 SERPL-SCNC: 19 MMOL/L (ref 22–31)
CREAT SERPL-MCNC: 0.79 MG/DL (ref 0.6–1.1)
EOSINOPHIL # BLD AUTO: 0.2 10E3/UL (ref 0–0.7)
EOSINOPHIL NFR BLD AUTO: 2 %
ERYTHROCYTE [DISTWIDTH] IN BLOOD BY AUTOMATED COUNT: 16.6 % (ref 10–15)
GFR SERPL CREATININE-BSD FRML MDRD: 85 ML/MIN/1.73M2
GLUCOSE BLD-MCNC: 97 MG/DL (ref 70–125)
HCT VFR BLD AUTO: 35.9 % (ref 35–47)
HGB BLD-MCNC: 11.2 G/DL (ref 11.7–15.7)
IMM GRANULOCYTES # BLD: 0 10E3/UL
IMM GRANULOCYTES NFR BLD: 0 %
LYMPHOCYTES # BLD AUTO: 2.5 10E3/UL (ref 0.8–5.3)
LYMPHOCYTES NFR BLD AUTO: 31 %
MAGNESIUM SERPL-MCNC: 2 MG/DL (ref 1.8–2.6)
MCH RBC QN AUTO: 20.8 PG (ref 26.5–33)
MCHC RBC AUTO-ENTMCNC: 31.2 G/DL (ref 31.5–36.5)
MCV RBC AUTO: 67 FL (ref 78–100)
MONOCYTES # BLD AUTO: 0.5 10E3/UL (ref 0–1.3)
MONOCYTES NFR BLD AUTO: 6 %
NEUTROPHILS # BLD AUTO: 4.9 10E3/UL (ref 1.6–8.3)
NEUTROPHILS NFR BLD AUTO: 60 %
NRBC # BLD AUTO: 0 10E3/UL
NRBC BLD AUTO-RTO: 0 /100
PLATELET # BLD AUTO: 246 10E3/UL (ref 150–450)
POTASSIUM BLD-SCNC: 4 MMOL/L (ref 3.5–5)
PROT SERPL-MCNC: 7.1 G/DL (ref 6–8)
RBC # BLD AUTO: 5.39 10E6/UL (ref 3.8–5.2)
SODIUM SERPL-SCNC: 139 MMOL/L (ref 136–145)
TROPONIN I SERPL-MCNC: <0.01 NG/ML (ref 0–0.29)
TSH SERPL DL<=0.005 MIU/L-ACNC: 0.91 UIU/ML (ref 0.3–5)
WBC # BLD AUTO: 8.2 10E3/UL (ref 4–11)

## 2023-05-31 PROCEDURE — 84443 ASSAY THYROID STIM HORMONE: CPT | Performed by: EMERGENCY MEDICINE

## 2023-05-31 PROCEDURE — 80053 COMPREHEN METABOLIC PANEL: CPT | Performed by: EMERGENCY MEDICINE

## 2023-05-31 PROCEDURE — 83735 ASSAY OF MAGNESIUM: CPT | Performed by: EMERGENCY MEDICINE

## 2023-05-31 PROCEDURE — 85025 COMPLETE CBC W/AUTO DIFF WBC: CPT | Performed by: EMERGENCY MEDICINE

## 2023-05-31 PROCEDURE — 84484 ASSAY OF TROPONIN QUANT: CPT | Performed by: EMERGENCY MEDICINE

## 2023-05-31 PROCEDURE — 36415 COLL VENOUS BLD VENIPUNCTURE: CPT | Performed by: EMERGENCY MEDICINE

## 2023-05-31 PROCEDURE — 99284 EMERGENCY DEPT VISIT MOD MDM: CPT

## 2023-05-31 PROCEDURE — 93005 ELECTROCARDIOGRAM TRACING: CPT | Performed by: EMERGENCY MEDICINE

## 2023-05-31 ASSESSMENT — ACTIVITIES OF DAILY LIVING (ADL): ADLS_ACUITY_SCORE: 35

## 2023-05-31 ASSESSMENT — ENCOUNTER SYMPTOMS
FATIGUE: 1
SLEEP DISTURBANCE: 1
COLOR CHANGE: 1
DIZZINESS: 1

## 2023-06-01 VITALS
TEMPERATURE: 98 F | OXYGEN SATURATION: 94 % | HEART RATE: 62 BPM | HEIGHT: 63 IN | RESPIRATION RATE: 27 BRPM | WEIGHT: 171.1 LBS | DIASTOLIC BLOOD PRESSURE: 94 MMHG | BODY MASS INDEX: 30.32 KG/M2 | SYSTOLIC BLOOD PRESSURE: 147 MMHG

## 2023-06-01 RX ORDER — FLUCONAZOLE 150 MG/1
150 TABLET ORAL ONCE
Qty: 1 TABLET | Refills: 0 | Status: SHIPPED | OUTPATIENT
Start: 2023-06-01 | End: 2023-06-01

## 2023-06-01 RX ORDER — SULFAMETHOXAZOLE/TRIMETHOPRIM 800-160 MG
1 TABLET ORAL 2 TIMES DAILY
Qty: 6 TABLET | Refills: 0 | Status: SHIPPED | OUTPATIENT
Start: 2023-06-01 | End: 2023-06-04
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
## 2023-06-01 NOTE — ED TRIAGE NOTES
"Arrives to ED with c/o syncopal episode that occurred this afternoon. Reports \"passed out\" for approximately 20 minutes. Has UTI which pt has been \"self medicating\" with amoxicillin and vaginal creams last 2 days. Denies CP. Denies thinners. Denies head pain and vision changes.      Triage Assessment     Row Name 05/31/23 2100       Triage Assessment (Adult)    Airway WDL WDL       Respiratory WDL    Respiratory WDL WDL       Skin Circulation/Temperature WDL    Skin Circulation/Temperature WDL WDL       Cardiac WDL    Cardiac WDL WDL       Peripheral/Neurovascular WDL    Peripheral Neurovascular WDL WDL       Cognitive/Neuro/Behavioral WDL    Cognitive/Neuro/Behavioral WDL WDL              "

## 2023-06-01 NOTE — ED NOTES
Patient discharged to home as ordered. DEC delayed, so referral placed by Dr. Mitchell. Pt noted to be anxious and had removed her IV prior to discharge, states she sometimes feels anxious and that's why she removed all monitoring as she felt she had to get out. Checked, and catheter tip intact. Patient ambulated out of unit with all belongings. Verbalized to this RN that she lives a little over a block away and is to ambulate home, states she ambulated here; this RN verbalized to patient that it is late, she refused transport assistance with taxi and refused recommendations to call for someone to drive her. Did give this RN her cell number; this RN requested and will call in approx 30 min which pt states is timeline it will take her to get home. Verbalized full understanding of discharge teaching, and prescriptions provided. No signs of distress, outside of recurrent mentions of distress related to her son.
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
## 2023-06-01 NOTE — DISCHARGE INSTRUCTIONS
Continue antibiotics as previously prescribed to complete full course for the urinary tract infection.  Recommend follow-up with your primary doctor or the rapid access clinic for recheck of syncopal fluid and further testing if needed.  Recommend follow-up with outpatient therapy/mental health as recommended per discussion in ER and referral was placed for adult therapy services  call back to help schedule appointment.

## 2023-06-07 ENCOUNTER — VIRTUAL VISIT (OUTPATIENT)
Dept: BEHAVIORAL HEALTH | Facility: CLINIC | Age: 63
End: 2023-06-07
Payer: COMMERCIAL

## 2023-06-07 DIAGNOSIS — F43.23 ADJUSTMENT DISORDER WITH MIXED ANXIETY AND DEPRESSED MOOD: Primary | ICD-10-CM

## 2023-06-07 PROCEDURE — 90837 PSYTX W PT 60 MINUTES: CPT | Mod: VID | Performed by: SOCIAL WORKER

## 2023-06-07 ASSESSMENT — ANXIETY QUESTIONNAIRES
GAD7 TOTAL SCORE: 20
GAD7 TOTAL SCORE: 20
6. BECOMING EASILY ANNOYED OR IRRITABLE: NEARLY EVERY DAY
7. FEELING AFRAID AS IF SOMETHING AWFUL MIGHT HAPPEN: NEARLY EVERY DAY
1. FEELING NERVOUS, ANXIOUS, OR ON EDGE: MORE THAN HALF THE DAYS
5. BEING SO RESTLESS THAT IT IS HARD TO SIT STILL: NEARLY EVERY DAY
2. NOT BEING ABLE TO STOP OR CONTROL WORRYING: NEARLY EVERY DAY
GAD7 TOTAL SCORE: 20
4. TROUBLE RELAXING: NEARLY EVERY DAY
3. WORRYING TOO MUCH ABOUT DIFFERENT THINGS: NEARLY EVERY DAY
7. FEELING AFRAID AS IF SOMETHING AWFUL MIGHT HAPPEN: NEARLY EVERY DAY

## 2023-06-07 ASSESSMENT — PATIENT HEALTH QUESTIONNAIRE - PHQ9
SUM OF ALL RESPONSES TO PHQ QUESTIONS 1-9: 23
SUM OF ALL RESPONSES TO PHQ QUESTIONS 1-9: 23
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
## 2023-06-14 ENCOUNTER — VIRTUAL VISIT (OUTPATIENT)
Dept: BEHAVIORAL HEALTH | Facility: CLINIC | Age: 63
End: 2023-06-14
Payer: COMMERCIAL

## 2023-06-14 DIAGNOSIS — F43.23 ADJUSTMENT DISORDER WITH MIXED ANXIETY AND DEPRESSED MOOD: Primary | ICD-10-CM

## 2023-06-14 PROCEDURE — 90791 PSYCH DIAGNOSTIC EVALUATION: CPT | Mod: VID | Performed by: SOCIAL WORKER

## 2023-06-14 ASSESSMENT — COLUMBIA-SUICIDE SEVERITY RATING SCALE - C-SSRS
6. IN YOUR LIFETIME, HAVE YOU EVER DONE ANYTHING, STARTED TO DO ANYTHING, OR PREPARED TO DO ANYTHING TO END YOUR LIFE?: NO
1. IN THE PAST MONTH, HAVE YOU WISHED YOU WERE DEAD OR WISHED YOU COULD GO TO SLEEP AND NOT WAKE UP?: NO
2. HAVE YOU ACTUALLY HAD ANY THOUGHTS OF KILLING YOURSELF?: YES
5. HAVE YOU STARTED TO WORK OUT OR WORKED OUT THE DETAILS OF HOW TO KILL YOURSELF? DO YOU INTEND TO CARRY OUT THIS PLAN?: NO
3. HAVE YOU BEEN THINKING ABOUT HOW YOU MIGHT KILL YOURSELF?: YES
4. HAVE YOU HAD THESE THOUGHTS AND HAD SOME INTENTION OF ACTING ON THEM?: NO
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
## 2023-06-15 ENCOUNTER — DOCUMENTATION ONLY (OUTPATIENT)
Dept: PSYCHOLOGY | Facility: CLINIC | Age: 63
End: 2023-06-15
Payer: COMMERCIAL
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
## 2023-06-19 LAB
ATRIAL RATE - MUSE: 83 BPM
DIASTOLIC BLOOD PRESSURE - MUSE: NORMAL MMHG
INTERPRETATION ECG - MUSE: NORMAL
P AXIS - MUSE: 10 DEGREES
PR INTERVAL - MUSE: 138 MS
QRS DURATION - MUSE: 80 MS
QT - MUSE: 370 MS
QTC - MUSE: 434 MS
R AXIS - MUSE: -26 DEGREES
SYSTOLIC BLOOD PRESSURE - MUSE: NORMAL MMHG
T AXIS - MUSE: 47 DEGREES
VENTRICULAR RATE- MUSE: 83 BPM

## 2023-08-12 ENCOUNTER — HEALTH MAINTENANCE LETTER (OUTPATIENT)
Age: 63
End: 2023-08-12

## 2023-08-25 ENCOUNTER — APPOINTMENT (OUTPATIENT)
Dept: RADIOLOGY | Facility: CLINIC | Age: 63
End: 2023-08-25
Attending: EMERGENCY MEDICINE
Payer: COMMERCIAL

## 2023-08-25 ENCOUNTER — HOSPITAL ENCOUNTER (EMERGENCY)
Facility: CLINIC | Age: 63
Discharge: HOME | End: 2023-08-25
Admitting: EMERGENCY MEDICINE
Payer: COMMERCIAL

## 2023-08-25 VITALS
BODY MASS INDEX: 28.35 KG/M2 | DIASTOLIC BLOOD PRESSURE: 99 MMHG | TEMPERATURE: 98.7 F | SYSTOLIC BLOOD PRESSURE: 185 MMHG | HEIGHT: 63 IN | WEIGHT: 160 LBS | OXYGEN SATURATION: 99 % | RESPIRATION RATE: 20 BRPM | HEART RATE: 91 BPM

## 2023-08-25 DIAGNOSIS — U07.1 COVID-19: ICD-10-CM

## 2023-08-25 LAB
ALBUMIN UR-MCNC: NEGATIVE MG/DL
ANION GAP SERPL CALCULATED.3IONS-SCNC: 10 MMOL/L (ref 5–18)
APPEARANCE UR: CLEAR
ATRIAL RATE - MUSE: 65 BPM
BILIRUB UR QL STRIP: NEGATIVE
BUN SERPL-MCNC: 9 MG/DL (ref 8–22)
CALCIUM SERPL-MCNC: 9.4 MG/DL (ref 8.5–10.5)
CHLORIDE BLD-SCNC: 106 MMOL/L (ref 98–107)
CO2 SERPL-SCNC: 21 MMOL/L (ref 22–31)
COLOR UR AUTO: ABNORMAL
CREAT SERPL-MCNC: 0.86 MG/DL (ref 0.6–1.1)
D DIMER PPP FEU-MCNC: 0.48 UG/ML FEU (ref 0–0.5)
DIASTOLIC BLOOD PRESSURE - MUSE: NORMAL MMHG
ERYTHROCYTE [DISTWIDTH] IN BLOOD BY AUTOMATED COUNT: 16.9 % (ref 10–15)
FLUAV RNA SPEC QL NAA+PROBE: NEGATIVE
FLUBV RNA RESP QL NAA+PROBE: NEGATIVE
GFR SERPL CREATININE-BSD FRML MDRD: 76 ML/MIN/1.73M2
GLUCOSE BLD-MCNC: 94 MG/DL (ref 70–125)
GLUCOSE UR STRIP-MCNC: NEGATIVE MG/DL
HCT VFR BLD AUTO: 38 % (ref 35–47)
HGB BLD-MCNC: 11.6 G/DL (ref 11.7–15.7)
HGB UR QL STRIP: NEGATIVE
HYALINE CASTS: 1 /LPF
INTERPRETATION ECG - MUSE: NORMAL
KETONES UR STRIP-MCNC: NEGATIVE MG/DL
LEUKOCYTE ESTERASE UR QL STRIP: NEGATIVE
MAGNESIUM SERPL-MCNC: 2.1 MG/DL (ref 1.8–2.6)
MCH RBC QN AUTO: 20.5 PG (ref 26.5–33)
MCHC RBC AUTO-ENTMCNC: 30.5 G/DL (ref 31.5–36.5)
MCV RBC AUTO: 67 FL (ref 78–100)
MUCOUS THREADS #/AREA URNS LPF: PRESENT /LPF
NITRATE UR QL: NEGATIVE
P AXIS - MUSE: 43 DEGREES
PH UR STRIP: 5 [PH] (ref 5–7)
PLATELET # BLD AUTO: 245 10E3/UL (ref 150–450)
POTASSIUM BLD-SCNC: 3.6 MMOL/L (ref 3.5–5)
PR INTERVAL - MUSE: 158 MS
QRS DURATION - MUSE: 78 MS
QT - MUSE: 424 MS
QTC - MUSE: 440 MS
R AXIS - MUSE: -6 DEGREES
RBC # BLD AUTO: 5.65 10E6/UL (ref 3.8–5.2)
RBC URINE: <1 /HPF
RSV RNA SPEC NAA+PROBE: NEGATIVE
SARS-COV-2 RNA RESP QL NAA+PROBE: POSITIVE
SODIUM SERPL-SCNC: 137 MMOL/L (ref 136–145)
SP GR UR STRIP: 1.01 (ref 1–1.03)
SQUAMOUS EPITHELIAL: <1 /HPF
SYSTOLIC BLOOD PRESSURE - MUSE: NORMAL MMHG
T AXIS - MUSE: 18 DEGREES
TROPONIN I SERPL-MCNC: <0.01 NG/ML (ref 0–0.29)
UROBILINOGEN UR STRIP-MCNC: <2 MG/DL
VENTRICULAR RATE- MUSE: 65 BPM
WBC # BLD AUTO: 6.1 10E3/UL (ref 4–11)
WBC URINE: 2 /HPF

## 2023-08-25 PROCEDURE — 81001 URINALYSIS AUTO W/SCOPE: CPT | Performed by: EMERGENCY MEDICINE

## 2023-08-25 PROCEDURE — 85379 FIBRIN DEGRADATION QUANT: CPT | Performed by: EMERGENCY MEDICINE

## 2023-08-25 PROCEDURE — 84484 ASSAY OF TROPONIN QUANT: CPT | Performed by: EMERGENCY MEDICINE

## 2023-08-25 PROCEDURE — 83735 ASSAY OF MAGNESIUM: CPT | Performed by: EMERGENCY MEDICINE

## 2023-08-25 PROCEDURE — 71046 X-RAY EXAM CHEST 2 VIEWS: CPT

## 2023-08-25 PROCEDURE — 250N000011 HC RX IP 250 OP 636: Mod: JZ | Performed by: EMERGENCY MEDICINE

## 2023-08-25 PROCEDURE — 258N000003 HC RX IP 258 OP 636: Performed by: EMERGENCY MEDICINE

## 2023-08-25 PROCEDURE — 96375 TX/PRO/DX INJ NEW DRUG ADDON: CPT

## 2023-08-25 PROCEDURE — 99285 EMERGENCY DEPT VISIT HI MDM: CPT | Mod: 25

## 2023-08-25 PROCEDURE — 93005 ELECTROCARDIOGRAM TRACING: CPT | Performed by: EMERGENCY MEDICINE

## 2023-08-25 PROCEDURE — 85027 COMPLETE CBC AUTOMATED: CPT | Performed by: EMERGENCY MEDICINE

## 2023-08-25 PROCEDURE — 250N000013 HC RX MED GY IP 250 OP 250 PS 637: Performed by: EMERGENCY MEDICINE

## 2023-08-25 PROCEDURE — 96374 THER/PROPH/DIAG INJ IV PUSH: CPT

## 2023-08-25 PROCEDURE — 80048 BASIC METABOLIC PNL TOTAL CA: CPT | Performed by: EMERGENCY MEDICINE

## 2023-08-25 PROCEDURE — 87637 SARSCOV2&INF A&B&RSV AMP PRB: CPT | Performed by: EMERGENCY MEDICINE

## 2023-08-25 PROCEDURE — 36415 COLL VENOUS BLD VENIPUNCTURE: CPT | Performed by: EMERGENCY MEDICINE

## 2023-08-25 RX ORDER — KETOROLAC TROMETHAMINE 15 MG/ML
15 INJECTION, SOLUTION INTRAMUSCULAR; INTRAVENOUS ONCE
Status: COMPLETED | OUTPATIENT
Start: 2023-08-25 | End: 2023-08-25

## 2023-08-25 RX ORDER — ONDANSETRON 2 MG/ML
4 INJECTION INTRAMUSCULAR; INTRAVENOUS ONCE
Status: COMPLETED | OUTPATIENT
Start: 2023-08-25 | End: 2023-08-25

## 2023-08-25 RX ORDER — ACETAMINOPHEN 325 MG/1
650 TABLET ORAL ONCE
Status: COMPLETED | OUTPATIENT
Start: 2023-08-25 | End: 2023-08-25

## 2023-08-25 RX ORDER — IBUPROFEN 600 MG/1
600 TABLET, FILM COATED ORAL ONCE
Status: DISCONTINUED | OUTPATIENT
Start: 2023-08-25 | End: 2023-08-25

## 2023-08-25 RX ADMIN — ACETAMINOPHEN 650 MG: 325 TABLET ORAL at 08:58

## 2023-08-25 RX ADMIN — KETOROLAC TROMETHAMINE 15 MG: 15 INJECTION, SOLUTION INTRAMUSCULAR; INTRAVENOUS at 11:49

## 2023-08-25 RX ADMIN — ONDANSETRON 4 MG: 2 INJECTION INTRAMUSCULAR; INTRAVENOUS at 11:48

## 2023-08-25 RX ADMIN — SODIUM CHLORIDE 1000 ML: 9 INJECTION, SOLUTION INTRAVENOUS at 11:47

## 2023-08-25 NOTE — DISCHARGE INSTRUCTIONS
You were seen and evaluated here in the ED for evaluation of headache, bodyaches, cough, fever. You are COVID positive. Fortunately, work up here is reassuring. I recommend tylenol 1000 mg and ibuprofen 600 mg every 6 hours as needed for pain, headaches and fever.     If you develop chest pain, shortness of breath, coughing up blood or other concerns return to the ED.     Otherwise follow up with primary care in 10 days if symptoms are not improving.

## 2023-08-25 NOTE — ED TRIAGE NOTES
Pt arrives to the ED in tears. She was unable to really tell the RN what exactly was going on. She is talking a lot about her son. The pt did say that her symptoms started yesterday and she was taking tylenol and ibuprofen without relief from her symptoms. She has generalized body aches and headache.

## 2023-08-25 NOTE — ED PROVIDER NOTES
EMERGENCY DEPARTMENT ENCOUNTER      NAME: Alejandra Kumar  AGE: 62 year old female  YOB: 1960  MRN: [id number]  EVALUATION DATE & TIME: No admission date for patient encounter.    PCP: No Ref-Primary, Physician    ED PROVIDER: Isabel Subramanian PA-C      Chief Complaint   Patient presents with    Headache    Generalized Body Aches         FINAL IMPRESSION:  1. COVID-19          MEDICAL DECISION MAKING:    Pertinent Labs & Imaging studies reviewed. (See chart for details)  62 year old female presents to the Emergency Department for evaluation of headache, bodyaches, fever, cough. The patient started to not feel well with headaches, body aches, fever and cough.  She felt so miserable this morning despite taking Tylenol and ibuprofen since symptom onset and presented to the emergency department.  On my evaluation, the patient was hypertensive at 185/99 but otherwise vitally stable.  Examination with heart and regular rate and rhythm and lungs clear to auscultation bilaterally.  Abdomen soft, nontender without any rebound or guarding.  Differential diagnosis included COVID, influenza, other viral illness, ACS, PE, pneumonia, electrolyte derangement, UTI, anemia.    Patient has a history of anemia and hemoglobin is stable at 11.6 without any other derangements including normal white blood cell count of 6.1.  UA without any signs of infection.  BMP without any significant derangement.  Magnesium normal at 2.1.  EKG normal sinus rhythm without any ST or T wave changes concerning for ACS and troponin negative at less than 0.01.  Patient denies any chest pain and was having intermittent shortness of breath that was nonspecific.  At this time, with reassuring troponin and EKG I do not feel ACS is likely cause of her symptoms.  D-dimer was negative at 0.48 and I do not feel CT PE study is indicated as I do not feel PE is a likely cause of her symptoms.  COVID testing is positive.  Chest x-ray was  independently interpreted by myself did not show any obvious pneumothorax or consolidation concerning for pneumonia.  Formal read with negative chest x-ray.  At this time, patient is feeling significantly improved after Toradol, Zofran, fluids and Tylenol here in the emergency department.  We discussed Paxlovid and patient declines at this time.  Signs symptoms to return to the emergency department were discussed and symptomatic cares with Tylenol, ibuprofen, rest and fluids.  Patient felt comfortable with discharge home with close primary care follow-up in approximately 10 days if symptoms not improving.  All questions were to the best my ability and she was discharged in stable condition.    Medical Decision Making    History:  Supplemental history from: Documented in chart, if applicable  External Record(s) reviewed: Documented in chart, if applicable.    Work Up:  Chart documentation includes differential considered and any EKGs or imaging independently interpreted by provider, where specified.  In additional to work up documented, I considered the following work up: Documented in chart, if applicable.    External consultation:  Discussion of management with another provider: Documented in chart, if applicable    Complicating factors:  Care impacted by chronic illness: Mental Health  Care affected by social determinants of health: Access to Medical Care    Disposition considerations: Discharge. I discussed a prescription for Paxlovid, but deferred after shared decision making discussion.. See documentation for any additional details.         ED COURSE:  9:15 AM I met with the patient, obtained history, performed an initial exam, and discussed options and plan for diagnostics and treatment here in the ED.    12:00 PM I reassessed patient and she is feeling significantly improved.  Discussed work-up to this point including positive COVID testing and other negative results.  Patient reassured.    1:40 PM Patient  discharged after being provided with extensive anticipatory guidance and given return precautions, importance of PCP follow-up emphasized.    At the conclusion of the encounter I discussed the results of all of the tests and the disposition. The questions were answered. The patient or family acknowledged understanding and was agreeable with the care plan.     MEDICATIONS GIVEN IN THE EMERGENCY:  Medications   acetaminophen (TYLENOL) tablet 650 mg (650 mg Oral $Given 8/25/23 0892)   ketorolac (TORADOL) injection 15 mg (15 mg Intravenous $Given 8/25/23 1149)   0.9% sodium chloride BOLUS (1,000 mLs Intravenous $New Bag 8/25/23 1147)   ondansetron (ZOFRAN) injection 4 mg (4 mg Intravenous $Given 8/25/23 1148)       NEW PRESCRIPTIONS STARTED AT TODAY'S ER VISIT  New Prescriptions    No medications on file            =================================================================    HPI:    Patient information was obtained from: The patient    Use of Interpretor: N/A         Rgcarmelina Faheem Kumar is a 62 year old female with a pertinent history of anxiety who presents to this ED via cab for evaluation of headache and bodyaches. The patient has not been feeling well with headaches, lightheadedness, bodyaches, nausea and chills since yesterday. She has been taking tylenol and ibuprofen without significant improvement of symptoms and presented to the ED. Patient reports her son is sick with similar symptoms. She reports sore throat and intermittent cough. She has not had any chest pain, but does state she has had some intermittent shortness of breath. She denies any leg pain, swelling, or coughing up blood. She denies any vomiting, diarrhea, abdominal pain or black/bloody stools. She has not been able to eat or drink much due to her symptoms. No other concerns voiced at this time.       REVIEW OF SYSTEMS:  Negative unless otherwise stated in the above HPI.       PAST MEDICAL HISTORY:  History reviewed. No pertinent past  "medical history.    PAST SURGICAL HISTORY:  Past Surgical History:   Procedure Laterality Date    CATARACT IOL, RT/LT Bilateral            CURRENT MEDICATIONS:    No current facility-administered medications for this encounter.    Current Outpatient Medications:     acetaminophen (TYLENOL) 500 MG tablet, Take 500 mg by mouth, Disp: , Rfl:     ALPRAZolam (XANAX) 0.5 MG tablet, Take 1 tablet (0.5 mg) by mouth 3 times daily as needed for anxiety, Disp: 8 tablet, Rfl: 0    propranolol (INDERAL) 10 MG tablet, TAKE 1 TABLET BY MOUTH DAILY AS NEEDED FOR ANXIETY, Disp: , Rfl:     traZODone (DESYREL) 50 MG tablet, Take 1 tablet (50 mg) by mouth At Bedtime, Disp: 10 tablet, Rfl: 0      ALLERGIES:  Allergies   Allergen Reactions    Kiwi Extract Itching     Other reaction(s): THROAT SWELLING, Throat Swelling/Closing    Tetracycline GI Disturbance     Other reaction(s): GI Bleeding, GI ULCER  Other reaction(s): GI Bleeding  stomach upset         FAMILY HISTORY:  Family History   Problem Relation Age of Onset    Cancer Mother     Cancer Maternal Grandmother     Cancer Paternal Grandmother     Diabetes No family hx of     Glaucoma No family hx of     Hypertension No family hx of        SOCIAL HISTORY:   Social History     Socioeconomic History    Marital status:      Spouse name: None    Number of children: None    Years of education: None    Highest education level: None   Tobacco Use    Smoking status: Never    Smokeless tobacco: Never   Substance and Sexual Activity    Alcohol use: Never    Drug use: Never       VITALS:  Patient Vitals for the past 24 hrs:   BP Temp Temp src Pulse Resp SpO2 Height Weight   08/25/23 1152 -- 98.7  F (37.1  C) Oral -- -- -- -- --   08/25/23 0855 (!) 185/99 (!) 102.1  F (38.9  C) Temporal 91 20 99 % 1.6 m (5' 3\") 72.6 kg (160 lb)       PHYSICAL EXAM   Constitutional: Well developed, Well nourished, NAD  HENT: Normocephalic, Atraumatic, Bilateral external ears normal, Oropharynx normal, " mucous membranes moist, Nose normal.   Neck: Normal range of motion, No tenderness, Supple, No stridor.  Eyes: Eyes track normally throughout exam, Conjunctiva normal, No discharge.   Respiratory: Normal breath sounds, No respiratory distress, No wheezing, Speaks full sentences easily. No cough.  Cardiovascular: Normal heart rate, Regular rhythm, No murmurs, No rubs, No gallops. Chest wall nontender.  GI: Soft, No tenderness, No masses, No flank tenderness. No rebound or guarding.  Musculoskeletal: 2+ DP pulses. No edema. No cyanosis, No clubbing. Good range of motion in all major joints. No tenderness to palpation or major deformities noted. No tenderness of the CTLS spine.   Integument: Warm, Dry, No erythema, No rash. No petechiae.  Neurologic: Alert & oriented x 3, Normal motor function, Normal sensory function, No focal deficits noted. Normal gait.  Psychiatric: Affect normal, Judgment normal, Mood normal. Cooperative.    LAB:  All pertinent labs reviewed and interpreted.  Recent Results (from the past 24 hour(s))   UA with Microscopic reflex to Culture    Collection Time: 08/25/23  9:03 AM    Specimen: Urine, Midstream   Result Value Ref Range    Color Urine Light Yellow Colorless, Straw, Light Yellow, Yellow    Appearance Urine Clear Clear    Glucose Urine Negative Negative mg/dL    Bilirubin Urine Negative Negative    Ketones Urine Negative Negative mg/dL    Specific Gravity Urine 1.011 1.001 - 1.030    Blood Urine Negative Negative    pH Urine 5.0 5.0 - 7.0    Protein Albumin Urine Negative Negative mg/dL    Urobilinogen Urine <2.0 <2.0 mg/dL    Nitrite Urine Negative Negative    Leukocyte Esterase Urine Negative Negative    Mucus Urine Present (A) None Seen /LPF    RBC Urine <1 <=2 /HPF    WBC Urine 2 <=5 /HPF    Squamous Epithelials Urine <1 <=1 /HPF    Hyaline Casts Urine 1 <=2 /LPF   Symptomatic Influenza A/B, RSV, & SARS-CoV2 PCR (COVID-19) Nasopharyngeal    Collection Time: 08/25/23 11:23 AM     Specimen: Nasopharyngeal; Swab   Result Value Ref Range    Influenza A PCR Negative Negative    Influenza B PCR Negative Negative    RSV PCR Negative Negative    SARS CoV2 PCR Positive (A) Negative   CBC (+ platelets, no diff)    Collection Time: 08/25/23 11:47 AM   Result Value Ref Range    WBC Count 6.1 4.0 - 11.0 10e3/uL    RBC Count 5.65 (H) 3.80 - 5.20 10e6/uL    Hemoglobin 11.6 (L) 11.7 - 15.7 g/dL    Hematocrit 38.0 35.0 - 47.0 %    MCV 67 (L) 78 - 100 fL    MCH 20.5 (L) 26.5 - 33.0 pg    MCHC 30.5 (L) 31.5 - 36.5 g/dL    RDW 16.9 (H) 10.0 - 15.0 %    Platelet Count 245 150 - 450 10e3/uL   Basic metabolic panel    Collection Time: 08/25/23 11:47 AM   Result Value Ref Range    Sodium 137 136 - 145 mmol/L    Potassium 3.6 3.5 - 5.0 mmol/L    Chloride 106 98 - 107 mmol/L    Carbon Dioxide (CO2) 21 (L) 22 - 31 mmol/L    Anion Gap 10 5 - 18 mmol/L    Urea Nitrogen 9 8 - 22 mg/dL    Creatinine 0.86 0.60 - 1.10 mg/dL    Calcium 9.4 8.5 - 10.5 mg/dL    Glucose 94 70 - 125 mg/dL    GFR Estimate 76 >60 mL/min/1.73m2   Magnesium    Collection Time: 08/25/23 11:47 AM   Result Value Ref Range    Magnesium 2.1 1.8 - 2.6 mg/dL   Troponin I (now)    Collection Time: 08/25/23 11:47 AM   Result Value Ref Range    Troponin I <0.01 0.00 - 0.29 ng/mL   D dimer quantitative    Collection Time: 08/25/23 11:47 AM   Result Value Ref Range    D-Dimer Quantitative 0.48 0.00 - 0.50 ug/mL FEU         RADIOLOGY:  Reviewed all pertinent imaging. Please see official radiology report.  Chest XR,  PA & LAT   Final Result   IMPRESSION: Negative chest.        EKG:    Performed at: 13:07    Impression: Sinus rhythm     Rate: 65 BPM  Rhythm: Sinus rhythm  Axis: 43 -6 18  IN Interval: 158 ms  QRS Interval: 78 ms  QTc Interval: 440 ms   ST Changes: N/A  Comparison: When compared with ECG of May 21st 2023, no significant change was found.     I have independently reviewed and interpreted the EKG(s) documented above.   PROCEDURES:   None.     Isabel  Moris MAN  Emergency Medicine  [facility]  8/25/2023      Isabel Subramanian PA-C  08/25/23 9240

## 2023-10-13 ENCOUNTER — OFFICE VISIT (OUTPATIENT)
Dept: FAMILY MEDICINE | Facility: CLINIC | Age: 63
End: 2023-10-13
Payer: COMMERCIAL

## 2023-10-13 VITALS
HEART RATE: 57 BPM | OXYGEN SATURATION: 98 % | WEIGHT: 165.3 LBS | BODY MASS INDEX: 29.28 KG/M2 | DIASTOLIC BLOOD PRESSURE: 82 MMHG | RESPIRATION RATE: 16 BRPM | TEMPERATURE: 98 F | SYSTOLIC BLOOD PRESSURE: 144 MMHG

## 2023-10-13 DIAGNOSIS — M25.511 ACUTE PAIN OF RIGHT SHOULDER: Primary | ICD-10-CM

## 2023-10-13 DIAGNOSIS — F41.9 ANXIETY: ICD-10-CM

## 2023-10-13 DIAGNOSIS — M79.601 MUSCULOSKELETAL PAIN OF UPPER EXTREMITY, RIGHT: ICD-10-CM

## 2023-10-13 DIAGNOSIS — G47.00 INSOMNIA, UNSPECIFIED TYPE: ICD-10-CM

## 2023-10-13 PROCEDURE — 99204 OFFICE O/P NEW MOD 45 MIN: CPT | Performed by: FAMILY MEDICINE

## 2023-10-13 RX ORDER — PREDNISONE 20 MG/1
40 TABLET ORAL DAILY
Qty: 10 TABLET | Refills: 0 | Status: SHIPPED | OUTPATIENT
Start: 2023-10-13 | End: 2023-10-13

## 2023-10-13 RX ORDER — PREDNISONE 20 MG/1
40 TABLET ORAL DAILY
Qty: 10 TABLET | Refills: 0 | Status: SHIPPED | OUTPATIENT
Start: 2023-10-13

## 2023-10-13 RX ORDER — NAPROXEN 500 MG/1
500 TABLET ORAL 2 TIMES DAILY WITH MEALS
Qty: 30 TABLET | Refills: 0 | Status: SHIPPED | OUTPATIENT
Start: 2023-10-13

## 2023-10-13 RX ORDER — NAPROXEN 500 MG/1
500 TABLET ORAL 2 TIMES DAILY WITH MEALS
Qty: 30 TABLET | Refills: 0 | Status: SHIPPED | OUTPATIENT
Start: 2023-10-13 | End: 2023-10-13

## 2023-10-13 NOTE — PATIENT INSTRUCTIONS
Resume your trazodone for sleep take 30 minutes prior to sleep will help with your anxiety as well    Resume naproxen at bedtime -ok to take with your trazodone at bedtime     If take naproxen 2 times a day it has to be at least 10 to 12 hours apart     Use heating pad to shoulder and upper back       Start prednisone-steroid for inflammation/pain  2 tablets daily for 5 days-take one now and then second dose in am every day for 5 days, do not take at night -will make it difficult to sleep     Schedule appointment with [facility]    If anxiety does not improve or more severe, go to the [facility] ER and asked to be seen for your anxiety at the [facility] to have your anxiety treated with medication until can be seen by new primary care provider

## 2023-10-13 NOTE — PROGRESS NOTES
ASSESSMENT/PLAN:      ICD-10-CM    1. Acute pain of right shoulder  M25.511     Acute flare of right shoulder pain, hx of steroid ijnection 7/2023 -pain resolved,[facility] -waiting for call back  for appt next week      2. Musculoskeletal pain of upper extremity, right  M79.601 naproxen (NAPROSYN) 500 MG tablet     predniSONE (DELTASONE) 20 MG tablet     DISCONTINUED: predniSONE (DELTASONE) 20 MG tablet     DISCONTINUED: naproxen (NAPROSYN) 500 MG tablet     DISCONTINUED: predniSONE (DELTASONE) 20 MG tablet    muscle spasms of the right upper back and scapula, right sided neck pain, ? cervical radicular symptoms, short course of prednisone, will start naproxen      3. Anxiety  F41.9     h/o anxiety and panic attacks,   becomes anxious with flares of pain- naprroxen/pain control decreases her anxiety      4. Insomnia, unspecified type  G47.00     pt with hx of trauma,does not sleep well-has trazodone presciirbed by ER, will try this for sleep, may help with anxiety as well          Patient Instructions     Resume your trazodone for sleep take 30 minutes prior to sleep will help with your anxiety as well    Resume naproxen at bedtime -ok to take with your trazodone at bedtime     If take naproxen 2 times a day it has to be at least 10 to 12 hours apart     Use heating pad to shoulder and upper back       Start prednisone-steroid for inflammation/pain  2 tablets daily for 5 days-take one now and then second dose in am every day for 5 days, do not take at night -will make it difficult to sleep     Schedule appointment with [facility]    If anxiety does not improve or more severe, go to the [facility] ER and asked to be seen for your anxiety at the [facility] to have your anxiety treated with medication until can be seen by new primary care provider               Reviewed medication instructions and side effects. Follow up if experiences side effects.     I reviewed supportive care, otc meds  to use if needed, expected course, and signs of concern.  Follow up as needed or if she does not improve within  1-2 days or if worsens in any way.  Reviewed red flag symptoms and is to go to the ER if experiences any of these.     The use of Dragon/[employer] services may have been used to construct the content in this note; any grammatical or spelling errors are non-intentional. Please contact the author of this note directly if you are in need of any clarification.      On the day of the encounter, time spend on chart review, patient visit, review of testing, documentation was 40  minutes              Patient presents with:  Anxiety: Right Shoulder pain       Subjective     Alejandra Kumar is a 62 year old female who presents to clinic today for the following health issues:    HPI     Musculoskeletal problem/pain    Duration: since 4/2023  Description  Location: right shoulder-driving a lot,if leaves arm in one position started have pain in elbow up into shoulder   Intensity:    Accompanying signs and symptoms: injection in shoulder back of shoulder in 7/2023, shoulder feels heavy and weak-pain down back of shoulder started at end of August, by beginning of September   Followed by [facility]       History  Previous similar problem: YES- as noted   Previous evaluation:  MRI and orthopedic evaluation  Precipitating or alleviating factors:  Trauma or overuse: no   Aggravating factors include: lifting and overuse  Therapies tried and outcome: naproxen     Anxiety -  Patient with history of anxiety-associated with trauma  Works with a counselor, 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 Allergy-lips swelling   At the time she had the symptoms, seen in  ER said it was her panic attack but patient continued to have symptoms when she was taking the hydroxyzine   patient stopped  hydroxyzine and symptoms resolved,     Now with pain increasing anxiety has increased -requesting medication for he anxiety,   Taking naproxen-helps with discomfort and seems to help with anxiety   Denies suicidal ideation or plan   Insomnia-hard to get to sleep and awakens during sleep-she has trazodone from past ER visit noted on chart-she has not tried this medication, concerned she would not wake up if her son who awakens multiple times -hx of trauma-has panic attacks at night and she has to assist him at night     No past medical history on file.  Social History     Tobacco Use    Smoking status: Never    Smokeless tobacco: Never   Substance Use Topics    Alcohol use: Never       Current Outpatient Medications   Medication Sig Dispense Refill    naproxen (NAPROSYN) 500 MG tablet Take 1 tablet (500 mg) by mouth 2 times daily (with meals) 30 tablet 0    predniSONE (DELTASONE) 20 MG tablet Take 2 tablets (40 mg) by mouth daily 10 tablet 0    acetaminophen (TYLENOL) 500 MG tablet Take 500 mg by mouth      traZODone (DESYREL) 50 MG tablet Take 1 tablet (50 mg) by mouth At Bedtime 10 tablet 0     Allergies   Allergen Reactions    Kiwi Extract Itching     Other reaction(s): THROAT SWELLING, Throat Swelling/Closing    Tetracycline GI Disturbance     Other reaction(s): GI Bleeding, GI ULCER  Other reaction(s): GI Bleeding  stomach upset           ROS are negative, except as otherwise noted HPI      Objective    BP (!) 144/82   Pulse 57   Temp 98  F (36.7  C) (Oral)   Resp 16   Wt 75 kg (165 lb 4.8 oz)   SpO2 98%   BMI 29.28 kg/m    Body mass index is 29.28 kg/m .  Physical Exam   GENERAL:alert and no distress  NECK: no adenopathy, no asymmetry,   MS:   neck-mild tender to palpation over the right posterior paraspinous muscles,, nontender on left  tender to palpation along the upper border of the right trapezius, and muscles over the  right scapula/border of the scapula,, nontender on left,tender over the  paraspinous muscles of the upper thoracic spine    Right shoulder-normal strength in abduction, mild crepitus with range of motion of shoulder mild pain with range of motion    NEURO: Normal strength and tone,5/5 strength upper extremities  mentation intact and speech normal, normal gait   :   PSYCH: mentation appears normal, affect anxious       Diagnostic Test Results:  Labs reviewed in Epic  No results found for any visits on 10/13/23.

## 2024-06-05 ENCOUNTER — OFFICE VISIT (OUTPATIENT)
Dept: FAMILY MEDICINE | Facility: CLINIC | Age: 64
End: 2024-06-05
Payer: COMMERCIAL

## 2024-06-05 VITALS
HEART RATE: 56 BPM | SYSTOLIC BLOOD PRESSURE: 125 MMHG | RESPIRATION RATE: 16 BRPM | DIASTOLIC BLOOD PRESSURE: 77 MMHG | OXYGEN SATURATION: 98 % | TEMPERATURE: 97.5 F

## 2024-06-05 DIAGNOSIS — R07.9 CHEST PAIN, UNSPECIFIED TYPE: Primary | ICD-10-CM

## 2024-06-05 PROCEDURE — 93010 ELECTROCARDIOGRAM REPORT: CPT | Performed by: GENERAL ACUTE CARE HOSPITAL

## 2024-06-05 PROCEDURE — 93005 ELECTROCARDIOGRAM TRACING: CPT | Performed by: PHYSICIAN ASSISTANT

## 2024-06-05 PROCEDURE — 99214 OFFICE O/P EST MOD 30 MIN: CPT | Performed by: PHYSICIAN ASSISTANT

## 2024-06-05 NOTE — PROGRESS NOTES
Assessment & Plan:      Problem List Items Addressed This Visit    None  Visit Diagnoses       Chest pain, unspecified type    -  Primary    Relevant Orders    EKG 12-lead, tracing only (Completed)          Medical Decision Making  Patient presents with sudden onset chest pains shortly before presenting to the clinic.  EKG shows sinus bradycardia with otherwise no signs of ischemia.  Informed patient that EKG does not fully rule out all forms of heart attack.  Recommended patient be seen in the emergency room for chest pains.  Patient politely declined and states she will continue to monitor symptoms at home.  She will go to the emergency room if the severe pains return again.  Otherwise patient does not appear in distress and has stable vital signs here at the clinic.     Subjective:      History provided by the patient.  She is also here with her son.  Alejandra Kumar is a 63 year old female here for evaluation of sudden onset chest pains.  Patient was going home from another appointment this morning.  While patient was driving she suddenly developed severe central chest pains that radiated into the right neck.  She pulled the vehicle over and laid back for couple minutes and the pains improved.  Patient now noticing a dull ache.  Patient is concerned about possible heart attack versus aneurysm versus anxiety versus side effect of the steroid shot she got for her right shoulder.  Son, who was playing video games on the phone during the visit, is concerned about possible constriction of the aorta versus something abnormal with her heart valves.     The following portions of the patient's history were reviewed and updated as appropriate: allergies, current medications, and problem list.     Review of Systems  Pertinent items are noted in HPI.    Allergies  Allergies   Allergen Reactions    Azelaic Acid Hives    Kiwi Extract Itching     Other reaction(s): THROAT SWELLING, Throat Swelling/Closing     Tetracycline GI Disturbance     Other reaction(s): GI Bleeding, GI ULCER  Other reaction(s): GI Bleeding  stomach upset         Family History   Problem Relation Age of Onset    Cancer Mother     Cancer Maternal Grandmother     Cancer Paternal Grandmother     Diabetes No family hx of     Glaucoma No family hx of     Hypertension No family hx of        Social History     Tobacco Use    Smoking status: Never    Smokeless tobacco: Never   Substance Use Topics    Alcohol use: Never        Objective:      /77   Pulse 56   Temp 97.5  F (36.4  C) (Oral)   Resp 16   SpO2 98%   General appearance - alert, well appearing, and in no distress and non-toxic  Chest - clear to auscultation, no wheezes, rales or rhonchi, symmetric air entry  Heart - normal rate, regular rhythm, normal S1, S2, no murmurs, rubs, clicks or gallops     Lab & Imaging Results    Results for orders placed or performed in visit on 06/05/24   EKG 12-lead, tracing only     Status: None (Preliminary result)   Result Value Ref Range    Systolic Blood Pressure  mmHg    Diastolic Blood Pressure  mmHg    Ventricular Rate 50 BPM    Atrial Rate 50 BPM    ND Interval 162 ms    QRS Duration 86 ms     ms    QTc 430 ms    P Axis -8 degrees    R AXIS -23 degrees    T Axis 12 degrees    Interpretation ECG       Sinus bradycardia  Otherwise normal ECG  When compared with ECG of 25-AUG-2023 13:07,  No significant change was found         I personally reviewed these results and discussed findings with the patient.    The use of Dragon/[employer] dictation services was used to construct the content of this note; any grammatical errors are non-intentional. Please contact the author directly if you are in need of any clarification.

## 2024-06-06 LAB
ATRIAL RATE - MUSE: 50 BPM
DIASTOLIC BLOOD PRESSURE - MUSE: NORMAL MMHG
INTERPRETATION ECG - MUSE: NORMAL
P AXIS - MUSE: -8 DEGREES
PR INTERVAL - MUSE: 162 MS
QRS DURATION - MUSE: 86 MS
QT - MUSE: 472 MS
QTC - MUSE: 430 MS
R AXIS - MUSE: -23 DEGREES
SYSTOLIC BLOOD PRESSURE - MUSE: NORMAL MMHG
T AXIS - MUSE: 12 DEGREES
VENTRICULAR RATE- MUSE: 50 BPM

## 2024-10-05 ENCOUNTER — HEALTH MAINTENANCE LETTER (OUTPATIENT)
Age: 64
End: 2024-10-05

## 2025-08-31 ENCOUNTER — HEALTH MAINTENANCE LETTER (OUTPATIENT)
Age: 65
End: 2025-08-31

## (undated) RX ORDER — ATROPINE SULFATE 0.4 MG/ML
AMPUL (ML) INJECTION
Status: DISPENSED
Start: 2021-09-03

## (undated) RX ORDER — METOPROLOL TARTRATE 1 MG/ML
INJECTION, SOLUTION INTRAVENOUS
Status: DISPENSED
Start: 2021-09-03

## (undated) RX ORDER — DOBUTAMINE HYDROCHLORIDE 200 MG/100ML
INJECTION INTRAVENOUS
Status: DISPENSED
Start: 2021-09-03